# Patient Record
Sex: FEMALE | Race: WHITE | Employment: OTHER | URBAN - METROPOLITAN AREA
[De-identification: names, ages, dates, MRNs, and addresses within clinical notes are randomized per-mention and may not be internally consistent; named-entity substitution may affect disease eponyms.]

---

## 2017-01-16 ENCOUNTER — OFFICE VISIT (OUTPATIENT)
Dept: INTERNAL MEDICINE | Age: 82
End: 2017-01-16

## 2017-01-16 VITALS
BODY MASS INDEX: 22.6 KG/M2 | SYSTOLIC BLOOD PRESSURE: 122 MMHG | HEART RATE: 70 BPM | DIASTOLIC BLOOD PRESSURE: 70 MMHG | HEIGHT: 67 IN | WEIGHT: 144 LBS

## 2017-01-16 DIAGNOSIS — E03.9 HYPOTHYROIDISM, UNSPECIFIED TYPE: ICD-10-CM

## 2017-01-16 DIAGNOSIS — E78.5 HYPERLIPIDEMIA, UNSPECIFIED HYPERLIPIDEMIA TYPE: Primary | ICD-10-CM

## 2017-01-16 DIAGNOSIS — H83.09 LABYRINTHITIS, UNSPECIFIED LATERALITY: ICD-10-CM

## 2017-01-16 DIAGNOSIS — D36.9 TUBULAR ADENOMA: ICD-10-CM

## 2017-01-16 DIAGNOSIS — M15.9 GENERALIZED OSTEOARTHRITIS: ICD-10-CM

## 2017-01-16 DIAGNOSIS — E55.9 VITAMIN D DEFICIENCY DISEASE: ICD-10-CM

## 2017-01-16 DIAGNOSIS — H35.30 MACULAR DEGENERATION: ICD-10-CM

## 2017-01-16 PROCEDURE — 99213 OFFICE O/P EST LOW 20 MIN: CPT | Performed by: INTERNAL MEDICINE

## 2017-01-16 PROCEDURE — 4040F PNEUMOC VAC/ADMIN/RCVD: CPT | Performed by: INTERNAL MEDICINE

## 2017-01-16 PROCEDURE — 1090F PRES/ABSN URINE INCON ASSESS: CPT | Performed by: INTERNAL MEDICINE

## 2017-01-16 PROCEDURE — G8399 PT W/DXA RESULTS DOCUMENT: HCPCS | Performed by: INTERNAL MEDICINE

## 2017-01-16 PROCEDURE — G8419 CALC BMI OUT NRM PARAM NOF/U: HCPCS | Performed by: INTERNAL MEDICINE

## 2017-01-16 PROCEDURE — G8427 DOCREV CUR MEDS BY ELIG CLIN: HCPCS | Performed by: INTERNAL MEDICINE

## 2017-01-16 PROCEDURE — 1123F ACP DISCUSS/DSCN MKR DOCD: CPT | Performed by: INTERNAL MEDICINE

## 2017-01-16 PROCEDURE — G8484 FLU IMMUNIZE NO ADMIN: HCPCS | Performed by: INTERNAL MEDICINE

## 2017-01-16 PROCEDURE — 1036F TOBACCO NON-USER: CPT | Performed by: INTERNAL MEDICINE

## 2017-02-13 DIAGNOSIS — E78.5 HYPERLIPIDEMIA, UNSPECIFIED HYPERLIPIDEMIA TYPE: ICD-10-CM

## 2017-02-13 LAB
ALT SERPL-CCNC: 15 U/L (ref 10–40)
AST SERPL-CCNC: 18 U/L (ref 15–37)
CHOLESTEROL, TOTAL: 170 MG/DL (ref 0–199)
HDLC SERPL-MCNC: 48 MG/DL (ref 40–60)
LDL CHOLESTEROL CALCULATED: 107 MG/DL
TOTAL CK: 50 U/L (ref 26–192)
TRIGL SERPL-MCNC: 75 MG/DL (ref 0–150)
VLDLC SERPL CALC-MCNC: 15 MG/DL

## 2017-02-20 RX ORDER — MECLIZINE HYDROCHLORIDE 25 MG/1
TABLET ORAL
Qty: 60 TABLET | Refills: 3 | Status: SHIPPED | OUTPATIENT
Start: 2017-02-20

## 2017-02-20 RX ORDER — LEVOTHYROXINE SODIUM 0.03 MG/1
TABLET ORAL
Qty: 90 TABLET | Refills: 3 | Status: SHIPPED | OUTPATIENT
Start: 2017-02-20 | End: 2018-09-11 | Stop reason: SDUPTHER

## 2017-07-20 ENCOUNTER — OFFICE VISIT (OUTPATIENT)
Dept: INTERNAL MEDICINE | Age: 82
End: 2017-07-20

## 2017-07-20 VITALS
DIASTOLIC BLOOD PRESSURE: 76 MMHG | HEART RATE: 74 BPM | HEIGHT: 67 IN | SYSTOLIC BLOOD PRESSURE: 122 MMHG | BODY MASS INDEX: 21.97 KG/M2 | RESPIRATION RATE: 12 BRPM | WEIGHT: 140 LBS

## 2017-07-20 DIAGNOSIS — Z00.00 MEDICARE ANNUAL WELLNESS VISIT, SUBSEQUENT: Primary | ICD-10-CM

## 2017-07-20 DIAGNOSIS — Z12.11 SPECIAL SCREENING FOR MALIGNANT NEOPLASMS, COLON: ICD-10-CM

## 2017-07-20 DIAGNOSIS — E03.9 HYPOTHYROIDISM, UNSPECIFIED TYPE: ICD-10-CM

## 2017-07-20 DIAGNOSIS — E55.9 VITAMIN D DEFICIENCY DISEASE: ICD-10-CM

## 2017-07-20 DIAGNOSIS — Z00.00 PERIODIC HEALTH ASSESSMENT, GENERAL SCREENING, ADULT: ICD-10-CM

## 2017-07-20 DIAGNOSIS — E78.5 HYPERLIPIDEMIA, UNSPECIFIED HYPERLIPIDEMIA TYPE: ICD-10-CM

## 2017-07-20 LAB
BILIRUBIN, POC: NORMAL
BLOOD URINE, POC: NORMAL
CLARITY, POC: CLEAR
COLOR, POC: YELLOW
GLUCOSE URINE, POC: NORMAL
KETONES, POC: NORMAL
LEUKOCYTE EST, POC: NORMAL
NITRITE, POC: NORMAL
PH, POC: 6
PROTEIN, POC: NORMAL
SPECIFIC GRAVITY, POC: 1.01
UROBILINOGEN, POC: NORMAL

## 2017-07-20 PROCEDURE — G0439 PPPS, SUBSEQ VISIT: HCPCS | Performed by: INTERNAL MEDICINE

## 2017-07-20 PROCEDURE — 81002 URINALYSIS NONAUTO W/O SCOPE: CPT | Performed by: INTERNAL MEDICINE

## 2017-07-20 PROCEDURE — 93000 ELECTROCARDIOGRAM COMPLETE: CPT | Performed by: INTERNAL MEDICINE

## 2017-07-20 ASSESSMENT — LIFESTYLE VARIABLES: HOW OFTEN DO YOU HAVE A DRINK CONTAINING ALCOHOL: 0

## 2017-07-20 ASSESSMENT — ANXIETY QUESTIONNAIRES: GAD7 TOTAL SCORE: 0

## 2017-07-25 DIAGNOSIS — E03.9 HYPOTHYROIDISM, UNSPECIFIED TYPE: ICD-10-CM

## 2017-07-25 DIAGNOSIS — Z00.00 PERIODIC HEALTH ASSESSMENT, GENERAL SCREENING, ADULT: ICD-10-CM

## 2017-07-25 DIAGNOSIS — E78.5 HYPERLIPIDEMIA, UNSPECIFIED HYPERLIPIDEMIA TYPE: ICD-10-CM

## 2017-07-25 LAB
A/G RATIO: 2.1 (ref 1.1–2.2)
ALBUMIN SERPL-MCNC: 4.2 G/DL (ref 3.4–5)
ALP BLD-CCNC: 78 U/L (ref 40–129)
ALT SERPL-CCNC: 10 U/L (ref 10–40)
ANION GAP SERPL CALCULATED.3IONS-SCNC: 15 MMOL/L (ref 3–16)
AST SERPL-CCNC: 16 U/L (ref 15–37)
BASOPHILS ABSOLUTE: 0.1 K/UL (ref 0–0.2)
BASOPHILS RELATIVE PERCENT: 1 %
BILIRUB SERPL-MCNC: 0.4 MG/DL (ref 0–1)
BUN BLDV-MCNC: 23 MG/DL (ref 7–20)
CALCIUM SERPL-MCNC: 9.4 MG/DL (ref 8.3–10.6)
CHLORIDE BLD-SCNC: 101 MMOL/L (ref 99–110)
CHOLESTEROL, TOTAL: 204 MG/DL (ref 0–199)
CO2: 26 MMOL/L (ref 21–32)
CREAT SERPL-MCNC: 0.7 MG/DL (ref 0.6–1.2)
EOSINOPHILS ABSOLUTE: 0.1 K/UL (ref 0–0.6)
EOSINOPHILS RELATIVE PERCENT: 1.7 %
GFR AFRICAN AMERICAN: >60
GFR NON-AFRICAN AMERICAN: >60
GLOBULIN: 2 G/DL
GLUCOSE BLD-MCNC: 93 MG/DL (ref 70–99)
HCT VFR BLD CALC: 37.6 % (ref 36–48)
HDLC SERPL-MCNC: 53 MG/DL (ref 40–60)
HEMOGLOBIN: 12.6 G/DL (ref 12–16)
LDL CHOLESTEROL CALCULATED: 132 MG/DL
LYMPHOCYTES ABSOLUTE: 2.2 K/UL (ref 1–5.1)
LYMPHOCYTES RELATIVE PERCENT: 31.8 %
MCH RBC QN AUTO: 27.3 PG (ref 26–34)
MCHC RBC AUTO-ENTMCNC: 33.5 G/DL (ref 31–36)
MCV RBC AUTO: 81.4 FL (ref 80–100)
MONOCYTES ABSOLUTE: 0.4 K/UL (ref 0–1.3)
MONOCYTES RELATIVE PERCENT: 6.2 %
NEUTROPHILS ABSOLUTE: 4.1 K/UL (ref 1.7–7.7)
NEUTROPHILS RELATIVE PERCENT: 59.3 %
PDW BLD-RTO: 14.4 % (ref 12.4–15.4)
PLATELET # BLD: 169 K/UL (ref 135–450)
PMV BLD AUTO: 9.5 FL (ref 5–10.5)
POTASSIUM SERPL-SCNC: 4.8 MMOL/L (ref 3.5–5.1)
RBC # BLD: 4.63 M/UL (ref 4–5.2)
SODIUM BLD-SCNC: 142 MMOL/L (ref 136–145)
T4 FREE: 1.3 NG/DL (ref 0.9–1.8)
TOTAL CK: 63 U/L (ref 26–192)
TOTAL PROTEIN: 6.2 G/DL (ref 6.4–8.2)
TRIGL SERPL-MCNC: 95 MG/DL (ref 0–150)
TSH SERPL DL<=0.05 MIU/L-ACNC: 4.03 UIU/ML (ref 0.27–4.2)
VLDLC SERPL CALC-MCNC: 19 MG/DL
WBC # BLD: 7 K/UL (ref 4–11)

## 2017-07-31 RX ORDER — LEVOTHYROXINE SODIUM 0.03 MG/1
TABLET ORAL
Qty: 90 TABLET | Refills: 3 | Status: SHIPPED | OUTPATIENT
Start: 2017-07-31 | End: 2018-07-23 | Stop reason: SDUPTHER

## 2017-10-30 ENCOUNTER — HOSPITAL ENCOUNTER (OUTPATIENT)
Dept: MAMMOGRAPHY | Age: 82
Discharge: HOME | End: 2017-10-30
Attending: INTERNAL MEDICINE | Admitting: INTERNAL MEDICINE

## 2017-10-30 DIAGNOSIS — Z12.31 VISIT FOR SCREENING MAMMOGRAM: ICD-10-CM

## 2018-01-22 ENCOUNTER — OFFICE VISIT (OUTPATIENT)
Dept: INTERNAL MEDICINE | Age: 83
End: 2018-01-22

## 2018-01-22 ENCOUNTER — TELEPHONE (OUTPATIENT)
Dept: INTERNAL MEDICINE | Age: 83
End: 2018-01-22

## 2018-01-22 VITALS
DIASTOLIC BLOOD PRESSURE: 70 MMHG | RESPIRATION RATE: 12 BRPM | BODY MASS INDEX: 21.97 KG/M2 | SYSTOLIC BLOOD PRESSURE: 130 MMHG | WEIGHT: 140 LBS | HEART RATE: 68 BPM | HEIGHT: 67 IN

## 2018-01-22 DIAGNOSIS — E78.5 HYPERLIPIDEMIA, UNSPECIFIED HYPERLIPIDEMIA TYPE: Primary | ICD-10-CM

## 2018-01-22 DIAGNOSIS — E03.9 HYPOTHYROIDISM, UNSPECIFIED TYPE: ICD-10-CM

## 2018-01-22 DIAGNOSIS — E55.9 VITAMIN D DEFICIENCY DISEASE: ICD-10-CM

## 2018-01-22 PROCEDURE — G8399 PT W/DXA RESULTS DOCUMENT: HCPCS | Performed by: INTERNAL MEDICINE

## 2018-01-22 PROCEDURE — G8484 FLU IMMUNIZE NO ADMIN: HCPCS | Performed by: INTERNAL MEDICINE

## 2018-01-22 PROCEDURE — 99213 OFFICE O/P EST LOW 20 MIN: CPT | Performed by: INTERNAL MEDICINE

## 2018-01-22 PROCEDURE — G8420 CALC BMI NORM PARAMETERS: HCPCS | Performed by: INTERNAL MEDICINE

## 2018-01-22 PROCEDURE — 1090F PRES/ABSN URINE INCON ASSESS: CPT | Performed by: INTERNAL MEDICINE

## 2018-01-22 PROCEDURE — 1123F ACP DISCUSS/DSCN MKR DOCD: CPT | Performed by: INTERNAL MEDICINE

## 2018-01-22 PROCEDURE — 1036F TOBACCO NON-USER: CPT | Performed by: INTERNAL MEDICINE

## 2018-01-22 PROCEDURE — G8427 DOCREV CUR MEDS BY ELIG CLIN: HCPCS | Performed by: INTERNAL MEDICINE

## 2018-01-22 PROCEDURE — 4040F PNEUMOC VAC/ADMIN/RCVD: CPT | Performed by: INTERNAL MEDICINE

## 2018-01-22 RX ORDER — LANSOPRAZOLE 30 MG/1
30 CAPSULE, DELAYED RELEASE ORAL DAILY
COMMUNITY
End: 2019-06-11

## 2018-01-22 NOTE — PROGRESS NOTES
not applicable      Assessment: Hyperlipidemia. GERD - controlled. Plan:               Same regimen.  To come in for a more thorough exam.      Imer Torres MD

## 2018-03-13 RX ORDER — SIMVASTATIN 20 MG
TABLET ORAL
Qty: 90 TABLET | Refills: 3 | Status: SHIPPED | OUTPATIENT
Start: 2018-03-13 | End: 2018-11-24 | Stop reason: SDUPTHER

## 2018-05-10 ENCOUNTER — TELEPHONE (OUTPATIENT)
Dept: INTERNAL MEDICINE | Age: 83
End: 2018-05-10

## 2018-05-29 ENCOUNTER — OFFICE VISIT (OUTPATIENT)
Dept: INTERNAL MEDICINE | Age: 83
End: 2018-05-29

## 2018-05-29 ENCOUNTER — TELEPHONE (OUTPATIENT)
Dept: INTERNAL MEDICINE | Age: 83
End: 2018-05-29

## 2018-05-29 VITALS
BODY MASS INDEX: 22.13 KG/M2 | HEIGHT: 67 IN | DIASTOLIC BLOOD PRESSURE: 80 MMHG | RESPIRATION RATE: 12 BRPM | HEART RATE: 68 BPM | SYSTOLIC BLOOD PRESSURE: 122 MMHG | WEIGHT: 141 LBS

## 2018-05-29 DIAGNOSIS — R53.83 OTHER FATIGUE: Primary | ICD-10-CM

## 2018-05-29 PROCEDURE — 99213 OFFICE O/P EST LOW 20 MIN: CPT | Performed by: INTERNAL MEDICINE

## 2018-05-29 PROCEDURE — G8399 PT W/DXA RESULTS DOCUMENT: HCPCS | Performed by: INTERNAL MEDICINE

## 2018-05-29 PROCEDURE — 1036F TOBACCO NON-USER: CPT | Performed by: INTERNAL MEDICINE

## 2018-05-29 PROCEDURE — G8420 CALC BMI NORM PARAMETERS: HCPCS | Performed by: INTERNAL MEDICINE

## 2018-05-29 PROCEDURE — 1123F ACP DISCUSS/DSCN MKR DOCD: CPT | Performed by: INTERNAL MEDICINE

## 2018-05-29 PROCEDURE — 4040F PNEUMOC VAC/ADMIN/RCVD: CPT | Performed by: INTERNAL MEDICINE

## 2018-05-29 PROCEDURE — G8427 DOCREV CUR MEDS BY ELIG CLIN: HCPCS | Performed by: INTERNAL MEDICINE

## 2018-05-29 PROCEDURE — 1090F PRES/ABSN URINE INCON ASSESS: CPT | Performed by: INTERNAL MEDICINE

## 2018-07-23 ENCOUNTER — OFFICE VISIT (OUTPATIENT)
Dept: INTERNAL MEDICINE | Age: 83
End: 2018-07-23

## 2018-07-23 VITALS
SYSTOLIC BLOOD PRESSURE: 126 MMHG | WEIGHT: 143 LBS | HEIGHT: 67 IN | DIASTOLIC BLOOD PRESSURE: 76 MMHG | HEART RATE: 72 BPM | BODY MASS INDEX: 22.44 KG/M2 | RESPIRATION RATE: 12 BRPM

## 2018-07-23 DIAGNOSIS — E78.5 HYPERLIPIDEMIA, UNSPECIFIED HYPERLIPIDEMIA TYPE: ICD-10-CM

## 2018-07-23 DIAGNOSIS — E03.9 HYPOTHYROIDISM, UNSPECIFIED TYPE: ICD-10-CM

## 2018-07-23 DIAGNOSIS — R42 DIZZY: ICD-10-CM

## 2018-07-23 DIAGNOSIS — Z12.11 SPECIAL SCREENING FOR MALIGNANT NEOPLASMS, COLON: ICD-10-CM

## 2018-07-23 DIAGNOSIS — Z00.00 MEDICARE ANNUAL WELLNESS VISIT, SUBSEQUENT: Primary | ICD-10-CM

## 2018-07-23 LAB
BILIRUBIN, POC: NORMAL
BLOOD URINE, POC: NORMAL
CLARITY, POC: CLEAR
COLOR, POC: YELLOW
GLUCOSE URINE, POC: NORMAL
KETONES, POC: NORMAL
LEUKOCYTE EST, POC: NORMAL
NITRITE, POC: NORMAL
PH, POC: 5
PROTEIN, POC: NORMAL
SPECIFIC GRAVITY, POC: 1.02
UROBILINOGEN, POC: NORMAL

## 2018-07-23 PROCEDURE — 81002 URINALYSIS NONAUTO W/O SCOPE: CPT | Performed by: INTERNAL MEDICINE

## 2018-07-23 PROCEDURE — 4040F PNEUMOC VAC/ADMIN/RCVD: CPT | Performed by: INTERNAL MEDICINE

## 2018-07-23 PROCEDURE — G0439 PPPS, SUBSEQ VISIT: HCPCS | Performed by: INTERNAL MEDICINE

## 2018-07-23 PROCEDURE — 93000 ELECTROCARDIOGRAM COMPLETE: CPT | Performed by: INTERNAL MEDICINE

## 2018-07-23 ASSESSMENT — PATIENT HEALTH QUESTIONNAIRE - PHQ9: SUM OF ALL RESPONSES TO PHQ QUESTIONS 1-9: 0

## 2018-07-23 ASSESSMENT — ANXIETY QUESTIONNAIRES: GAD7 TOTAL SCORE: 0

## 2018-07-23 ASSESSMENT — LIFESTYLE VARIABLES: HOW OFTEN DO YOU HAVE A DRINK CONTAINING ALCOHOL: 0

## 2018-07-23 NOTE — PROGRESS NOTES
COLONOSCOPY  *Apr., 2017 ( prn )    Dr. Andrés Maldonado - diverticulosis, tubular adenoma    COLONOSCOPY  *April 11, 2017 ( prn )    Dr. Andrés Maldonado - tubular adenomata ( 3 )    EYE SURGERY  June, 2007    bilateral cataract extraction    UPPER GASTROINTESTINAL ENDOSCOPY  July, 2009    Dr. Fletcher Sandhoff - small sliding hiatus hernia    UPPER GASTROINTESTINAL ENDOSCOPY  Feb., 2012    Dr. Jimmy Santos - small stomach polyp   Rosemead Hug, 2015    Dr. Pola Lemus -        Current Outpatient Prescriptions   Medication Sig Dispense Refill    Multiple Vitamins-Minerals (PRESERVISION AREDS 2+MULTI VIT PO) Take by mouth daily      simvastatin (ZOCOR) 20 MG tablet TAKE 1 TABLET EVERY EVENING 90 tablet 3    levothyroxine (SYNTHROID) 25 MCG tablet TAKE 1 TABLET EVERY DAY 90 tablet 3    meclizine (ANTIVERT) 25 MG tablet TAKE 1 TABLET THREE TIMES A DAY AS NEEDED FOR VERTIGO 60 tablet 3    esomeprazole Magnesium (NEXIUM) 20 MG PACK Take 20 mg by mouth daily      B Complex Vitamins (VITAMIN-B COMPLEX) TABS Take  by mouth daily.  vitamin D (CHOLECALCIFEROL) 400 UNITS TABS tablet Take 2,000 Units by mouth daily       lansoprazole (PREVACID) 30 MG delayed release capsule Take 30 mg by mouth daily       No current facility-administered medications for this visit. Allergies   Allergen Reactions    Codeine     Epinephrine     Feldene [Piroxicam]     Other      Antidepressants    Reglan [Metoclopramide Hcl]        Review of Systems:     Immunization History   Administered Date(s) Administered    Influenza, High Dose 11/20/2013, 11/01/2016    Pneumococcal 13-valent Conjugate ([id number]) 11/01/2016    Pneumococcal Polysaccharide ([id number]) 06/12/2008    Td 12/03/2004    Tdap (Boostrix, Adacel) 01/19/2015    Zoster Live (Zostavax) 06/12/2008     Eye Exam:  July 19, 2018 by Dr. Tj Ricketts.   She will be seeing Dr. Hugh Soler  Chest: Denies: cough, hemoptysis, shortness of breath, pleuritic chest pain, wheezing  Cardiovascular: Denies: chest pain, dyspnea on exertion, orthopnea, paroxysmal nocturnal dyspnea, edema, palpitations  Abdomen: no abdominal pain, change in bowel habits, or black or bloody stools  Colonoscopy:  April, 2017 by Dr. Dollie Jeans revealed three tubular adenomas. To be repeated prn   Fall Risk low  ADL without assistance   Mammography:  October 30, 2017   DEXA: January, 2013 revealed a lumbar T score of - 1.2 and a hip T score of - 1.1. Pelvic and PAP: N/A  Other: For the last several weeks she has had dizziness with no other symptoms      Physical Exam:  General appearance: alert, appears stated age and cooperative  /76 (Site: Left Arm, Position: Sitting, Cuff Size: Medium Adult)   Pulse 72   Resp 12   Ht 5' 7\" (1.702 m)   Wt 143 lb (64.9 kg)   BMI 22.40 kg/m²   Eyes: conjunctivae/corneas clear. PERRL, EOM's intact. Fundi benign. Ears: normal TM's and external ear canals both ears  Nose: Nares normal. Septum midline. Mucosa normal. No drainage or sinus tenderness. Throat: no abnormalities  Neck: no adenopathy, no carotid bruit, no JVD, supple, symmetrical, trachea midline and thyroid not enlarged, symmetric, no tenderness/mass/nodules  Nodes:no adenopathy palpable  Breasts:Breasts symmetrical, skin without lesion(s), no nipple retraction or dimpling, no nipple discharge, no masses palpated, no axillary or supraclavicular adenopathy  Lungs: clear to auscultation bilaterally  Heart: regular rate and rhythm, S1, S2 normal, no murmur, click, rub or gallop  Abdomen: soft, non-tender; bowel sounds normal; no masses,  no organomegaly  Extremities: extremities normal, atraumatic, no cyanosis or edema  Neurological: Gait normal. Reflexes normal and symmetric.  Sensation grossly normal  Pulse: radial=4/4, femoral=4/4, popliteal=4/4, dorsalis pedis=4/4,   Skin: normal coloration and turgor, no rashes, no suspicious skin lesions noted  Psych: normal    Lab Review: not applicable  Assessment:

## 2018-07-25 ENCOUNTER — HOSPITAL ENCOUNTER (OUTPATIENT)
Dept: VASCULAR LAB | Age: 83
Discharge: HOME | End: 2018-07-25
Payer: MEDICARE

## 2018-07-25 DIAGNOSIS — E03.9 HYPOTHYROIDISM, UNSPECIFIED TYPE: ICD-10-CM

## 2018-07-25 DIAGNOSIS — R42 DIZZY: ICD-10-CM

## 2018-07-25 DIAGNOSIS — E78.5 HYPERLIPIDEMIA, UNSPECIFIED HYPERLIPIDEMIA TYPE: ICD-10-CM

## 2018-07-25 LAB
A/G RATIO: 2 (ref 1.1–2.2)
ALBUMIN SERPL-MCNC: 4.5 G/DL (ref 3.4–5)
ALP BLD-CCNC: 63 U/L (ref 40–129)
ALT SERPL-CCNC: 15 U/L (ref 10–40)
ANION GAP SERPL CALCULATED.3IONS-SCNC: 13 MMOL/L (ref 3–16)
AST SERPL-CCNC: 20 U/L (ref 15–37)
BASOPHILS ABSOLUTE: 0.1 K/UL (ref 0–0.2)
BASOPHILS RELATIVE PERCENT: 0.7 %
BILIRUB SERPL-MCNC: 0.4 MG/DL (ref 0–1)
BUN BLDV-MCNC: 24 MG/DL (ref 7–20)
CALCIUM SERPL-MCNC: 9.8 MG/DL (ref 8.3–10.6)
CHLORIDE BLD-SCNC: 103 MMOL/L (ref 99–110)
CHOLESTEROL, TOTAL: 194 MG/DL (ref 0–199)
CO2: 27 MMOL/L (ref 21–32)
CREAT SERPL-MCNC: 0.7 MG/DL (ref 0.6–1.2)
EOSINOPHILS ABSOLUTE: 0.1 K/UL (ref 0–0.6)
EOSINOPHILS RELATIVE PERCENT: 1.6 %
GFR AFRICAN AMERICAN: >60
GFR NON-AFRICAN AMERICAN: >60
GLOBULIN: 2.2 G/DL
GLUCOSE BLD-MCNC: 97 MG/DL (ref 70–99)
HCT VFR BLD CALC: 39.2 % (ref 36–48)
HDLC SERPL-MCNC: 61 MG/DL (ref 40–60)
HEMOGLOBIN: 13.2 G/DL (ref 12–16)
LDL CHOLESTEROL CALCULATED: 116 MG/DL
LYMPHOCYTES ABSOLUTE: 2.2 K/UL (ref 1–5.1)
LYMPHOCYTES RELATIVE PERCENT: 29.7 %
MCH RBC QN AUTO: 27.7 PG (ref 26–34)
MCHC RBC AUTO-ENTMCNC: 33.7 G/DL (ref 31–36)
MCV RBC AUTO: 82.2 FL (ref 80–100)
MONOCYTES ABSOLUTE: 0.5 K/UL (ref 0–1.3)
MONOCYTES RELATIVE PERCENT: 6.5 %
NEUTROPHILS ABSOLUTE: 4.6 K/UL (ref 1.7–7.7)
NEUTROPHILS RELATIVE PERCENT: 61.5 %
PDW BLD-RTO: 14.5 % (ref 12.4–15.4)
PLATELET # BLD: 185 K/UL (ref 135–450)
PMV BLD AUTO: 9.5 FL (ref 5–10.5)
POTASSIUM SERPL-SCNC: 5.1 MMOL/L (ref 3.5–5.1)
RBC # BLD: 4.76 M/UL (ref 4–5.2)
SODIUM BLD-SCNC: 143 MMOL/L (ref 136–145)
TOTAL CK: 65 U/L (ref 26–192)
TOTAL PROTEIN: 6.7 G/DL (ref 6.4–8.2)
TRIGL SERPL-MCNC: 86 MG/DL (ref 0–150)
TSH SERPL DL<=0.05 MIU/L-ACNC: 2.71 UIU/ML (ref 0.27–4.2)
VLDLC SERPL CALC-MCNC: 17 MG/DL
WBC # BLD: 7.5 K/UL (ref 4–11)

## 2018-07-25 PROCEDURE — 93880 EXTRACRANIAL BILAT STUDY: CPT

## 2018-08-09 DIAGNOSIS — Z12.11 SPECIAL SCREENING FOR MALIGNANT NEOPLASMS, COLON: ICD-10-CM

## 2018-08-09 LAB
CONTROL: NORMAL
HEMOCCULT STL QL: NORMAL

## 2018-08-09 PROCEDURE — 82274 ASSAY TEST FOR BLOOD FECAL: CPT | Performed by: INTERNAL MEDICINE

## 2018-09-11 RX ORDER — LEVOTHYROXINE SODIUM 0.03 MG/1
TABLET ORAL
Qty: 90 TABLET | Refills: 3 | Status: SHIPPED | OUTPATIENT
Start: 2018-09-11 | End: 2019-09-23 | Stop reason: SDUPTHER

## 2018-11-16 ENCOUNTER — HOSPITAL ENCOUNTER (OUTPATIENT)
Dept: MAMMOGRAPHY | Age: 83
Discharge: HOME | End: 2018-11-16
Payer: MEDICARE

## 2018-11-16 DIAGNOSIS — Z12.31 VISIT FOR SCREENING MAMMOGRAM: ICD-10-CM

## 2018-11-16 PROCEDURE — 77067 SCR MAMMO BI INCL CAD: CPT

## 2018-11-24 RX ORDER — SIMVASTATIN 20 MG
TABLET ORAL
Qty: 90 TABLET | Refills: 0 | Status: SHIPPED | OUTPATIENT
Start: 2018-11-24 | End: 2019-05-13 | Stop reason: SDUPTHER

## 2019-01-21 ENCOUNTER — OFFICE VISIT (OUTPATIENT)
Dept: INTERNAL MEDICINE CLINIC | Age: 84
End: 2019-01-21
Payer: MEDICARE

## 2019-01-21 VITALS
HEART RATE: 68 BPM | HEIGHT: 67 IN | WEIGHT: 148 LBS | RESPIRATION RATE: 12 BRPM | BODY MASS INDEX: 23.23 KG/M2 | SYSTOLIC BLOOD PRESSURE: 118 MMHG | DIASTOLIC BLOOD PRESSURE: 78 MMHG

## 2019-01-21 DIAGNOSIS — E78.5 HYPERLIPIDEMIA, UNSPECIFIED HYPERLIPIDEMIA TYPE: Primary | ICD-10-CM

## 2019-01-21 DIAGNOSIS — E03.9 HYPOTHYROIDISM, UNSPECIFIED TYPE: ICD-10-CM

## 2019-01-21 PROCEDURE — G8482 FLU IMMUNIZE ORDER/ADMIN: HCPCS | Performed by: INTERNAL MEDICINE

## 2019-01-21 PROCEDURE — 1101F PT FALLS ASSESS-DOCD LE1/YR: CPT | Performed by: INTERNAL MEDICINE

## 2019-01-21 PROCEDURE — 1090F PRES/ABSN URINE INCON ASSESS: CPT | Performed by: INTERNAL MEDICINE

## 2019-01-21 PROCEDURE — 1036F TOBACCO NON-USER: CPT | Performed by: INTERNAL MEDICINE

## 2019-01-21 PROCEDURE — 99213 OFFICE O/P EST LOW 20 MIN: CPT | Performed by: INTERNAL MEDICINE

## 2019-01-21 PROCEDURE — 1123F ACP DISCUSS/DSCN MKR DOCD: CPT | Performed by: INTERNAL MEDICINE

## 2019-01-21 PROCEDURE — 4040F PNEUMOC VAC/ADMIN/RCVD: CPT | Performed by: INTERNAL MEDICINE

## 2019-01-21 PROCEDURE — G8399 PT W/DXA RESULTS DOCUMENT: HCPCS | Performed by: INTERNAL MEDICINE

## 2019-01-21 PROCEDURE — G8427 DOCREV CUR MEDS BY ELIG CLIN: HCPCS | Performed by: INTERNAL MEDICINE

## 2019-01-21 PROCEDURE — G8420 CALC BMI NORM PARAMETERS: HCPCS | Performed by: INTERNAL MEDICINE

## 2019-05-13 RX ORDER — SIMVASTATIN 20 MG
TABLET ORAL
Qty: 90 TABLET | Refills: 3 | Status: SHIPPED | OUTPATIENT
Start: 2019-05-13 | End: 2020-06-05

## 2019-05-28 ENCOUNTER — TELEPHONE (OUTPATIENT)
Dept: INTERNAL MEDICINE CLINIC | Age: 84
End: 2019-05-28

## 2019-05-28 NOTE — TELEPHONE ENCOUNTER
She said it is the same thing as when she had low calcium   She said she wants to see you   Do you want her to do a blood test first?

## 2019-06-11 ENCOUNTER — OFFICE VISIT (OUTPATIENT)
Dept: INTERNAL MEDICINE CLINIC | Age: 84
End: 2019-06-11
Payer: MEDICARE

## 2019-06-11 ENCOUNTER — HOSPITAL ENCOUNTER (OUTPATIENT)
Dept: GENERAL RADIOLOGY | Age: 84
Discharge: HOME | End: 2019-06-11
Payer: MEDICARE

## 2019-06-11 ENCOUNTER — HOSPITAL ENCOUNTER (OUTPATIENT)
Age: 84
Discharge: HOME | End: 2019-06-11
Payer: MEDICARE

## 2019-06-11 VITALS
HEIGHT: 67 IN | DIASTOLIC BLOOD PRESSURE: 76 MMHG | HEART RATE: 68 BPM | WEIGHT: 148 LBS | RESPIRATION RATE: 12 BRPM | BODY MASS INDEX: 23.23 KG/M2 | SYSTOLIC BLOOD PRESSURE: 124 MMHG

## 2019-06-11 DIAGNOSIS — M25.551 PAIN OF BOTH HIP JOINTS: ICD-10-CM

## 2019-06-11 DIAGNOSIS — M25.552 PAIN OF BOTH HIP JOINTS: ICD-10-CM

## 2019-06-11 DIAGNOSIS — M25.552 PAIN OF BOTH HIP JOINTS: Primary | ICD-10-CM

## 2019-06-11 DIAGNOSIS — M25.551 PAIN OF BOTH HIP JOINTS: Primary | ICD-10-CM

## 2019-06-11 PROCEDURE — 72100 X-RAY EXAM L-S SPINE 2/3 VWS: CPT

## 2019-06-11 PROCEDURE — G8399 PT W/DXA RESULTS DOCUMENT: HCPCS | Performed by: INTERNAL MEDICINE

## 2019-06-11 PROCEDURE — 1123F ACP DISCUSS/DSCN MKR DOCD: CPT | Performed by: INTERNAL MEDICINE

## 2019-06-11 PROCEDURE — 73521 X-RAY EXAM HIPS BI 2 VIEWS: CPT

## 2019-06-11 PROCEDURE — 99213 OFFICE O/P EST LOW 20 MIN: CPT | Performed by: INTERNAL MEDICINE

## 2019-06-11 PROCEDURE — G8420 CALC BMI NORM PARAMETERS: HCPCS | Performed by: INTERNAL MEDICINE

## 2019-06-11 PROCEDURE — 4040F PNEUMOC VAC/ADMIN/RCVD: CPT | Performed by: INTERNAL MEDICINE

## 2019-06-11 PROCEDURE — 1090F PRES/ABSN URINE INCON ASSESS: CPT | Performed by: INTERNAL MEDICINE

## 2019-06-11 PROCEDURE — 1036F TOBACCO NON-USER: CPT | Performed by: INTERNAL MEDICINE

## 2019-06-11 PROCEDURE — G8427 DOCREV CUR MEDS BY ELIG CLIN: HCPCS | Performed by: INTERNAL MEDICINE

## 2019-06-11 RX ORDER — FAMOTIDINE 20 MG/1
20 TABLET, FILM COATED ORAL 2 TIMES DAILY
COMMUNITY
End: 2019-07-25 | Stop reason: CLARIF

## 2019-06-11 ASSESSMENT — PATIENT HEALTH QUESTIONNAIRE - PHQ9
SUM OF ALL RESPONSES TO PHQ9 QUESTIONS 1 & 2: 0
1. LITTLE INTEREST OR PLEASURE IN DOING THINGS: 0
SUM OF ALL RESPONSES TO PHQ QUESTIONS 1-9: 0
2. FEELING DOWN, DEPRESSED OR HOPELESS: 0
SUM OF ALL RESPONSES TO PHQ QUESTIONS 1-9: 0

## 2019-06-11 NOTE — PROGRESS NOTES
 Hypothyroidism     Past Medical History:   Diagnosis Date    Diverticulosis     Encounter for screening mammogram for breast cancer Octobere 9, 2015    Benign    GERD (gastroesophageal reflux disease)     Hyperlipidemia     Hypothyroidism 06/20/2016    Subclinical    Labyrinthitis     Macular degeneration     Osteopenia DEXA - June, 2010    Lumbar T score -1.2 and Hip T score -1.2    Osteopenia DEXA - Jan., 2013    Lumbar T score -1.2 and Hip T score -1.1    Other screening mammogram January 10, 2013    Benign    Other screening mammogram July 10, 2014    Negative    Rotator cuff tear, right Nov., 2015    Right by MRI    Screening mammogram for high-risk patient January 7, 2011    Negative    Screening mammogram, encounter for *October 9, 2015    Benign    Screening mammogram, encounter for *October 25, 2016    Benign    Screening mammogram, encounter for *Oct. 30, 2017    Negative    Screening mammogram, encounter for *November 16, 2018    Benign    Spinal stenosis     Tubular adenoma     Vitamin D deficiency disease 2013    Level - 24     Past Surgical History:   Procedure Laterality Date    APPENDECTOMY      COLONOSCOPY  July, 2009 ( 2014 )    Dr. Sherren Manning - left sided diverticulosis     COLONOSCOPY  *Apr., 2017 ( prn )    Dr. Allison Moon - diverticulosis, tubular adenoma    COLONOSCOPY  *April 11, 2017 ( prn )    Dr. Allison Moon - tubular adenomata ( 3 )    EYE SURGERY  June, 2007    bilateral cataract extraction    UPPER GASTROINTESTINAL ENDOSCOPY  July, 2009    Dr. Sherren Manning - small sliding hiatus hernia    UPPER GASTROINTESTINAL ENDOSCOPY  Feb., 2012    Dr. Ramesh Castro - small stomach polyp    Carole Lelia, 2015    Dr. Jere Mathew -      Current Outpatient Medications   Medication Sig Dispense Refill    famotidine (PEPCID) 20 MG tablet Take 20 mg by mouth 2 times daily      simvastatin (ZOCOR) 20 MG tablet TAKE 1 TABLET EVERY EVENING*ACCORD MFR* 90 tablet 3   

## 2019-07-25 ENCOUNTER — OFFICE VISIT (OUTPATIENT)
Dept: INTERNAL MEDICINE CLINIC | Age: 84
End: 2019-07-25
Payer: MEDICARE

## 2019-07-25 VITALS
RESPIRATION RATE: 12 BRPM | BODY MASS INDEX: 22.29 KG/M2 | HEART RATE: 78 BPM | WEIGHT: 142 LBS | SYSTOLIC BLOOD PRESSURE: 132 MMHG | HEIGHT: 67 IN | DIASTOLIC BLOOD PRESSURE: 76 MMHG

## 2019-07-25 DIAGNOSIS — E78.5 HYPERLIPIDEMIA, UNSPECIFIED HYPERLIPIDEMIA TYPE: ICD-10-CM

## 2019-07-25 DIAGNOSIS — Z00.00 MEDICARE ANNUAL WELLNESS VISIT, SUBSEQUENT: Primary | ICD-10-CM

## 2019-07-25 DIAGNOSIS — Z12.11 SPECIAL SCREENING FOR MALIGNANT NEOPLASMS, COLON: ICD-10-CM

## 2019-07-25 DIAGNOSIS — E03.9 HYPOTHYROIDISM, UNSPECIFIED TYPE: ICD-10-CM

## 2019-07-25 PROCEDURE — 1123F ACP DISCUSS/DSCN MKR DOCD: CPT | Performed by: INTERNAL MEDICINE

## 2019-07-25 PROCEDURE — 81002 URINALYSIS NONAUTO W/O SCOPE: CPT | Performed by: INTERNAL MEDICINE

## 2019-07-25 PROCEDURE — G0439 PPPS, SUBSEQ VISIT: HCPCS | Performed by: INTERNAL MEDICINE

## 2019-07-25 PROCEDURE — 4040F PNEUMOC VAC/ADMIN/RCVD: CPT | Performed by: INTERNAL MEDICINE

## 2019-07-25 PROCEDURE — 93000 ELECTROCARDIOGRAM COMPLETE: CPT | Performed by: INTERNAL MEDICINE

## 2019-07-25 ASSESSMENT — PATIENT HEALTH QUESTIONNAIRE - PHQ9
SUM OF ALL RESPONSES TO PHQ QUESTIONS 1-9: 0
SUM OF ALL RESPONSES TO PHQ QUESTIONS 1-9: 0

## 2019-07-25 ASSESSMENT — LIFESTYLE VARIABLES: HOW OFTEN DO YOU HAVE A DRINK CONTAINING ALCOHOL: 0

## 2019-07-29 DIAGNOSIS — E03.9 HYPOTHYROIDISM, UNSPECIFIED TYPE: ICD-10-CM

## 2019-07-29 DIAGNOSIS — E78.5 HYPERLIPIDEMIA, UNSPECIFIED HYPERLIPIDEMIA TYPE: ICD-10-CM

## 2019-07-29 LAB
A/G RATIO: 1.9 (ref 1.1–2.2)
ALBUMIN SERPL-MCNC: 4.1 G/DL (ref 3.4–5)
ALP BLD-CCNC: 58 U/L (ref 40–129)
ALT SERPL-CCNC: 10 U/L (ref 10–40)
ANION GAP SERPL CALCULATED.3IONS-SCNC: 14 MMOL/L (ref 3–16)
AST SERPL-CCNC: 15 U/L (ref 15–37)
BASOPHILS ABSOLUTE: 0.1 K/UL (ref 0–0.2)
BASOPHILS RELATIVE PERCENT: 0.6 %
BILIRUB SERPL-MCNC: 0.3 MG/DL (ref 0–1)
BUN BLDV-MCNC: 17 MG/DL (ref 7–20)
CALCIUM SERPL-MCNC: 9.3 MG/DL (ref 8.3–10.6)
CHLORIDE BLD-SCNC: 102 MMOL/L (ref 99–110)
CHOLESTEROL, TOTAL: 135 MG/DL (ref 0–199)
CO2: 24 MMOL/L (ref 21–32)
CREAT SERPL-MCNC: 0.7 MG/DL (ref 0.6–1.2)
EOSINOPHILS ABSOLUTE: 0.1 K/UL (ref 0–0.6)
EOSINOPHILS RELATIVE PERCENT: 1 %
GFR AFRICAN AMERICAN: >60
GFR NON-AFRICAN AMERICAN: >60
GLOBULIN: 2.2 G/DL
GLUCOSE BLD-MCNC: 102 MG/DL (ref 70–99)
HCT VFR BLD CALC: 34.6 % (ref 36–48)
HDLC SERPL-MCNC: 47 MG/DL (ref 40–60)
HEMOGLOBIN: 11.6 G/DL (ref 12–16)
LDL CHOLESTEROL CALCULATED: 75 MG/DL
LYMPHOCYTES ABSOLUTE: 1.9 K/UL (ref 1–5.1)
LYMPHOCYTES RELATIVE PERCENT: 19.8 %
MCH RBC QN AUTO: 27 PG (ref 26–34)
MCHC RBC AUTO-ENTMCNC: 33.5 G/DL (ref 31–36)
MCV RBC AUTO: 80.7 FL (ref 80–100)
MONOCYTES ABSOLUTE: 0.6 K/UL (ref 0–1.3)
MONOCYTES RELATIVE PERCENT: 6.6 %
NEUTROPHILS ABSOLUTE: 6.9 K/UL (ref 1.7–7.7)
NEUTROPHILS RELATIVE PERCENT: 72 %
PDW BLD-RTO: 13.7 % (ref 12.4–15.4)
PLATELET # BLD: 206 K/UL (ref 135–450)
PMV BLD AUTO: 9.5 FL (ref 5–10.5)
POTASSIUM SERPL-SCNC: 4.4 MMOL/L (ref 3.5–5.1)
RBC # BLD: 4.29 M/UL (ref 4–5.2)
SODIUM BLD-SCNC: 140 MMOL/L (ref 136–145)
TOTAL CK: 47 U/L (ref 26–192)
TOTAL PROTEIN: 6.3 G/DL (ref 6.4–8.2)
TRIGL SERPL-MCNC: 66 MG/DL (ref 0–150)
TSH SERPL DL<=0.05 MIU/L-ACNC: 2.14 UIU/ML (ref 0.27–4.2)
VLDLC SERPL CALC-MCNC: 13 MG/DL
WBC # BLD: 9.5 K/UL (ref 4–11)

## 2019-07-29 RX ORDER — FAMOTIDINE 20 MG/1
20 TABLET, FILM COATED ORAL DAILY
COMMUNITY
End: 2020-07-27

## 2019-07-30 DIAGNOSIS — D64.9 ANEMIA, UNSPECIFIED TYPE: Primary | ICD-10-CM

## 2019-07-30 DIAGNOSIS — D64.9 ANEMIA, UNSPECIFIED TYPE: ICD-10-CM

## 2019-07-30 LAB
IRON SATURATION: 17 % (ref 15–50)
IRON: 44 UG/DL (ref 37–145)
TOTAL IRON BINDING CAPACITY: 253 UG/DL (ref 260–445)

## 2019-08-07 ENCOUNTER — TELEPHONE (OUTPATIENT)
Dept: INTERNAL MEDICINE CLINIC | Age: 84
End: 2019-08-07

## 2019-08-07 DIAGNOSIS — M15.9 OSTEOARTHRITIS OF MULTIPLE JOINTS, UNSPECIFIED OSTEOARTHRITIS TYPE: Primary | ICD-10-CM

## 2019-08-07 NOTE — TELEPHONE ENCOUNTER
Pain in hand from arthritis is very bad. Can't even do therapy.   Would like a referral to Dr. Aide Horne  Fax # [phone]

## 2019-09-23 RX ORDER — LEVOTHYROXINE SODIUM 0.03 MG/1
TABLET ORAL
Qty: 90 TABLET | Refills: 3 | Status: SHIPPED | OUTPATIENT
Start: 2019-09-23 | End: 2020-07-24

## 2019-09-24 ENCOUNTER — HOSPITAL ENCOUNTER (EMERGENCY)
Age: 84
Discharge: HOME | End: 2019-09-24
Attending: EMERGENCY MEDICINE
Payer: MEDICARE

## 2019-09-24 ENCOUNTER — APPOINTMENT (OUTPATIENT)
Dept: CT IMAGING | Age: 84
End: 2019-09-24
Payer: MEDICARE

## 2019-09-24 VITALS
OXYGEN SATURATION: 97 % | SYSTOLIC BLOOD PRESSURE: 166 MMHG | HEART RATE: 98 BPM | WEIGHT: 142 LBS | RESPIRATION RATE: 18 BRPM | HEIGHT: 67 IN | BODY MASS INDEX: 22.29 KG/M2 | TEMPERATURE: 98.3 F | DIASTOLIC BLOOD PRESSURE: 81 MMHG

## 2019-09-24 DIAGNOSIS — S00.93XA TRAUMATIC CEPHALOHEMATOMA, INITIAL ENCOUNTER: Primary | ICD-10-CM
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
## 2019-09-24 PROCEDURE — 99284 EMERGENCY DEPT VISIT MOD MDM: CPT

## 2019-09-24 PROCEDURE — 6370000000 HC RX 637 (ALT 250 FOR IP): Performed by: EMERGENCY MEDICINE

## 2019-09-24 PROCEDURE — 70450 CT HEAD/BRAIN W/O DYE: CPT

## 2019-09-24 RX ORDER — ACETAMINOPHEN 325 MG/1
650 TABLET ORAL ONCE
Status: COMPLETED | OUTPATIENT
Start: 2019-09-24 | End: 2019-09-24

## 2019-09-24 RX ADMIN — ACETAMINOPHEN 650 MG: 325 TABLET ORAL at 19:28

## 2019-09-24 ASSESSMENT — PAIN DESCRIPTION - LOCATION: LOCATION: HEAD

## 2019-09-24 ASSESSMENT — PAIN DESCRIPTION - ORIENTATION: ORIENTATION: LEFT

## 2019-09-24 ASSESSMENT — PAIN DESCRIPTION - PAIN TYPE: TYPE: ACUTE PAIN

## 2019-09-24 ASSESSMENT — PAIN SCALES - GENERAL
PAINLEVEL_OUTOF10: 3
PAINLEVEL_OUTOF10: 3

## 2019-09-25 ENCOUNTER — TELEPHONE (OUTPATIENT)
Dept: INTERNAL MEDICINE CLINIC | Age: 84
End: 2019-09-25
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
## 2020-01-15 ENCOUNTER — HOSPITAL ENCOUNTER (OUTPATIENT)
Dept: MAMMOGRAPHY | Age: 85
Discharge: HOME | End: 2020-01-15
Payer: MEDICARE

## 2020-01-15 PROCEDURE — 77063 BREAST TOMOSYNTHESIS BI: CPT

## 2020-01-30 ENCOUNTER — OFFICE VISIT (OUTPATIENT)
Dept: INTERNAL MEDICINE CLINIC | Age: 85
End: 2020-01-30
Payer: MEDICARE

## 2020-01-30 VITALS
BODY MASS INDEX: 21.5 KG/M2 | WEIGHT: 137 LBS | RESPIRATION RATE: 12 BRPM | SYSTOLIC BLOOD PRESSURE: 120 MMHG | DIASTOLIC BLOOD PRESSURE: 80 MMHG | HEART RATE: 68 BPM | HEIGHT: 67 IN

## 2020-01-30 PROCEDURE — G8420 CALC BMI NORM PARAMETERS: HCPCS | Performed by: INTERNAL MEDICINE

## 2020-01-30 PROCEDURE — 1036F TOBACCO NON-USER: CPT | Performed by: INTERNAL MEDICINE

## 2020-01-30 PROCEDURE — 1123F ACP DISCUSS/DSCN MKR DOCD: CPT | Performed by: INTERNAL MEDICINE

## 2020-01-30 PROCEDURE — 1090F PRES/ABSN URINE INCON ASSESS: CPT | Performed by: INTERNAL MEDICINE

## 2020-01-30 PROCEDURE — G8427 DOCREV CUR MEDS BY ELIG CLIN: HCPCS | Performed by: INTERNAL MEDICINE

## 2020-01-30 PROCEDURE — 99213 OFFICE O/P EST LOW 20 MIN: CPT | Performed by: INTERNAL MEDICINE

## 2020-01-30 PROCEDURE — 4040F PNEUMOC VAC/ADMIN/RCVD: CPT | Performed by: INTERNAL MEDICINE

## 2020-01-30 PROCEDURE — G8482 FLU IMMUNIZE ORDER/ADMIN: HCPCS | Performed by: INTERNAL MEDICINE

## 2020-01-30 ASSESSMENT — PATIENT HEALTH QUESTIONNAIRE - PHQ9
2. FEELING DOWN, DEPRESSED OR HOPELESS: 0
SUM OF ALL RESPONSES TO PHQ QUESTIONS 1-9: 0
SUM OF ALL RESPONSES TO PHQ QUESTIONS 1-9: 0
1. LITTLE INTEREST OR PLEASURE IN DOING THINGS: 0
SUM OF ALL RESPONSES TO PHQ9 QUESTIONS 1 & 2: 0

## 2020-01-30 NOTE — PROGRESS NOTES
Chief Complaint   Patient presents with    Hyperlipidemia    Hypothyroidism        HPI:  Comes in for evaluation of hyprtlipidemia for which she is on simvastatin. She follows a reasinable diet and exercises. She also has hyperlipidemia for which she is on levothyroxine. She has no heat or cold intolerance, constipation or diarrhea, or pretibial linda,a. Social History     Socioeconomic History    Marital status:       Spouse name: Not on file    Number of children: 0    Years of education: Not on file    Highest education level: Not on file   Occupational History    Not on file   Social Needs    Financial resource strain: Not on file    Food insecurity:     Worry: Not on file     Inability: Not on file    Transportation needs:     Medical: Not on file     Non-medical: Not on file   Tobacco Use    Smoking status: Never Smoker    Smokeless tobacco: Never Used   Substance and Sexual Activity    Alcohol use: No     Alcohol/week: 0.0 standard drinks    Drug use: Not on file    Sexual activity: Not on file   Lifestyle    Physical activity:     Days per week: Not on file     Minutes per session: Not on file    Stress: Not on file   Relationships    Social connections:     Talks on phone: Not on file     Gets together: Not on file     Attends Judaism service: Not on file     Active member of club or organization: Not on file     Attends meetings of clubs or organizations: Not on file     Relationship status: Not on file    Intimate partner violence:     Fear of current or ex partner: Not on file     Emotionally abused: Not on file     Physically abused: Not on file     Forced sexual activity: Not on file   Other Topics Concern    Not on file   Social History Narrative    Living Will: No             Patient Active Problem List   Diagnosis    Hyperlipidemia    Hypothyroidism     Past Medical History:   Diagnosis Date    Diverticulosis     Encounter for screening mammogram for breast Multiple Vitamins-Minerals (PRESERVISION AREDS 2+MULTI VIT PO) Take by mouth daily      meclizine (ANTIVERT) 25 MG tablet TAKE 1 TABLET THREE TIMES A DAY AS NEEDED FOR VERTIGO 60 tablet 3    B Complex Vitamins (VITAMIN-B COMPLEX) TABS Take  by mouth daily.  vitamin D (CHOLECALCIFEROL) 400 UNITS TABS tablet Take 2,000 Units by mouth daily        No current facility-administered medications for this visit. Allergies   Allergen Reactions    Codeine     Epinephrine     Feldene [Piroxicam]     Other      Antidepressants    Reglan [Metoclopramide Hcl]        Physical Exam:   /80 (Site: Left Upper Arm, Position: Sitting, Cuff Size: Medium Adult)   Pulse 68   Resp 12   Ht 5' 7\" (1.702 m)   Wt 137 lb (62.1 kg)   BMI 21.46 kg/m²   General appearance: alert, appears stated age and cooperative  Lungs: clear to auscultation bilaterally  Heart: regular rate and rhythm, S1, S2 normal, no murmur, click, rub or gallop  Extremities: extremities normal, atraumatic, no cyanosis or edema  Other: N/A    Lab Review:   Orders Only on 11/26/2019   Component Date Value    Sed Rate 11/26/2019 15    Orders Only on 11/26/2019   Component Date Value    CRP 11/26/2019 1.0    Orders Only on 11/26/2019   Component Date Value    CREATININE 11/26/2019 0.7     GFR Non- 11/26/2019 >60     GFR  11/26/2019 >60    Orders Only on 11/26/2019   Component Date Value    AST 11/26/2019 18    Orders Only on 11/26/2019   Component Date Value    ALT 11/26/2019 18    Orders Only on 11/26/2019   Component Date Value    WBC 11/26/2019 9.0     RBC 11/26/2019 4.61     Hemoglobin 11/26/2019 12.6     Hematocrit 11/26/2019 38.0     MCV 11/26/2019 82.5     MCH 11/26/2019 27.4     MCHC 11/26/2019 33.2     RDW 11/26/2019 16.7*    Platelets 32/67/8807 204     MPV 11/26/2019 8.6          Assessment: Hyperlipidemia                         Hypothyroidism    Plan:               Same regimen.   To come in

## 2020-05-14 LAB
ANION GAP SERPL CALCULATED.3IONS-SCNC: 10 MMOL/L (ref 3–16)
BUN BLDV-MCNC: 25 MG/DL (ref 7–20)
CALCIUM SERPL-MCNC: 9.3 MG/DL (ref 8.3–10.6)
CHLORIDE BLD-SCNC: 104 MMOL/L (ref 99–110)
CO2: 27 MMOL/L (ref 21–32)
CREAT SERPL-MCNC: 0.6 MG/DL (ref 0.6–1.2)
GFR AFRICAN AMERICAN: >60
GFR NON-AFRICAN AMERICAN: >60
GLUCOSE BLD-MCNC: 81 MG/DL (ref 70–99)
MAGNESIUM: 2.1 MG/DL (ref 1.8–2.4)
POTASSIUM SERPL-SCNC: 4.3 MMOL/L (ref 3.5–5.1)
SODIUM BLD-SCNC: 141 MMOL/L (ref 136–145)
VITAMIN D 25-HYDROXY: 60.6 NG/ML

## 2020-06-05 RX ORDER — SIMVASTATIN 20 MG
TABLET ORAL
Qty: 90 TABLET | Refills: 3 | Status: SHIPPED | OUTPATIENT
Start: 2020-06-05

## 2020-07-24 RX ORDER — LEVOTHYROXINE SODIUM 0.03 MG/1
TABLET ORAL
Qty: 90 TABLET | Refills: 3 | Status: SHIPPED | OUTPATIENT
Start: 2020-07-24

## 2020-07-27 ENCOUNTER — OFFICE VISIT (OUTPATIENT)
Dept: INTERNAL MEDICINE CLINIC | Age: 85
End: 2020-07-27
Payer: MEDICARE

## 2020-07-27 VITALS
WEIGHT: 140.6 LBS | HEART RATE: 76 BPM | BODY MASS INDEX: 22.07 KG/M2 | SYSTOLIC BLOOD PRESSURE: 124 MMHG | HEIGHT: 67 IN | RESPIRATION RATE: 12 BRPM | DIASTOLIC BLOOD PRESSURE: 74 MMHG

## 2020-07-27 PROCEDURE — G0439 PPPS, SUBSEQ VISIT: HCPCS | Performed by: INTERNAL MEDICINE

## 2020-07-27 PROCEDURE — 93000 ELECTROCARDIOGRAM COMPLETE: CPT | Performed by: INTERNAL MEDICINE

## 2020-07-27 PROCEDURE — 1123F ACP DISCUSS/DSCN MKR DOCD: CPT | Performed by: INTERNAL MEDICINE

## 2020-07-27 PROCEDURE — 4040F PNEUMOC VAC/ADMIN/RCVD: CPT | Performed by: INTERNAL MEDICINE

## 2020-07-27 ASSESSMENT — PATIENT HEALTH QUESTIONNAIRE - PHQ9
SUM OF ALL RESPONSES TO PHQ QUESTIONS 1-9: 0
SUM OF ALL RESPONSES TO PHQ QUESTIONS 1-9: 0

## 2020-07-27 ASSESSMENT — LIFESTYLE VARIABLES: HOW OFTEN DO YOU HAVE A DRINK CONTAINING ALCOHOL: 0

## 2020-07-27 NOTE — PROGRESS NOTES
Zaria Harris Schirmer 80 y.o. presents today with   Chief Complaint   Patient presents with    Medicare AWV       Family History   Problem Relation Age of Onset    Other Mother 80        , polycythemia vera.  Other Father 80        , ASHD. Social History     Socioeconomic History    Marital status:       Spouse name: Not on file    Number of children: 0    Years of education: Not on file    Highest education level: Not on file   Occupational History    Not on file   Social Needs    Financial resource strain: Not on file    Food insecurity     Worry: Not on file     Inability: Not on file    Transportation needs     Medical: Not on file     Non-medical: Not on file   Tobacco Use    Smoking status: Never Smoker    Smokeless tobacco: Never Used   Substance and Sexual Activity    Alcohol use: No     Alcohol/week: 0.0 standard drinks    Drug use: Not on file    Sexual activity: Not on file   Lifestyle    Physical activity     Days per week: Not on file     Minutes per session: Not on file    Stress: Not on file   Relationships    Social connections     Talks on phone: Not on file     Gets together: Not on file     Attends Denominational service: Not on file     Active member of club or organization: Not on file     Attends meetings of clubs or organizations: Not on file     Relationship status: Not on file    Intimate partner violence     Fear of current or ex partner: Not on file     Emotionally abused: Not on file     Physically abused: Not on file     Forced sexual activity: Not on file   Other Topics Concern    Not on file   Social History Narrative    Living Will: No               Patient Active Problem List   Diagnosis    Hyperlipidemia    Hypothyroidism       Past Medical History:   Diagnosis Date    Diverticulosis     Encounter for screening mammogram for breast cancer 2015    Benign    GERD (gastroesophageal reflux disease)     Hyperlipidemia     Hypothyroidism 06/20/2016    Subclinical    Labyrinthitis     Macular degeneration     Osteopenia DEXA - June, 2010    Lumbar T score -1.2 and Hip T score -1.2    Osteopenia DEXA - Jan., 2013    Lumbar T score -1.2 and Hip T score -1.1    Other screening mammogram January 10, 2013    Benign    Other screening mammogram July 10, 2014    Negative    Rotator cuff tear, right Nov., 2015    Right by MRI    Screening mammogram for high-risk patient January 7, 2011    Negative    Screening mammogram for high-risk patient *January 15, 2920    Benign    Screening mammogram, encounter for *October 9, 2015    Benign    Screening mammogram, encounter for *October 25, 2016    Benign    Screening mammogram, encounter for *Oct. 30, 2017    Negative    Screening mammogram, encounter for *November 16, 2018    Benign    Spinal stenosis     Tubular adenoma     Vitamin D deficiency disease 2013    Level - 24        Past Surgical History:   Procedure Laterality Date    APPENDECTOMY      COLONOSCOPY  July, 2009 ( 2014 )    Dr. Pam Sevilla - left sided diverticulosis     COLONOSCOPY  *Apr., 2017 ( prn )    Dr. Carla Rodriguez - diverticulosis, tubular adenoma    COLONOSCOPY  *April 11, 2017 ( prn )    Dr. Carla Rodriguez - tubular adenomata ( 3 )    EYE SURGERY  June, 2007    bilateral cataract extraction    UPPER GASTROINTESTINAL ENDOSCOPY  July, 2009    Dr. Pam Sevilla - small sliding hiatus hernia    UPPER GASTROINTESTINAL ENDOSCOPY  Feb., 2012    Dr. Jaren Castrejon - small stomach polyp   Manishn Flight, 2015    Dr. Key Daly -        Current Outpatient Medications   Medication Sig Dispense Refill    Omeprazole 20 MG TBDD Take by mouth      levothyroxine (SYNTHROID) 25 MCG tablet TAKE 1 TABLET DAILY 90 tablet 3    simvastatin (ZOCOR) 20 MG tablet TAKE 1 TABLET EVERY EVENING*ACCORD MFR* 90 tablet 3    B Complex Vitamins (VITAMIN-B COMPLEX) TABS Take  by mouth daily.       vitamin D (CHOLECALCIFEROL) 400 UNITS TABS normal. Septum midline. Mucosa normal. No drainage or sinus tenderness. Throat: N/A  Neck: no adenopathy, no carotid bruit, no JVD, supple, symmetrical, trachea midline and thyroid not enlarged, symmetric, no tenderness/mass/nodules  Nodes:no adenopathy palpable  Breasts:Breasts symmetrical, skin without lesion(s), no nipple retraction or dimpling, no nipple discharge, no masses palpated, no axillary or supraclavicular adenopathy  Lungs: clear to auscultation bilaterally  Heart: regular rate and rhythm, S1, S2 normal, no murmur, click, rub or gallop  Abdomen: soft, non-tender; bowel sounds normal; no masses,  no organomegaly  Extremities: extremities normal, atraumatic, no cyanosis or edema  Neurological: Gait normal. Reflexes normal and symmetric. Sensation grossly normal  Pulse: radial=4/4, femoral=4/4, popliteal=4/4, dorsalis pedis=4/4,   Skin: normal coloration and turgor, no rashes, no suspicious skin lesions noted  Psych: normal    Lab Review: not applicable  Assessment: Stable    Plan:              ECG, UA and fasting labs, and hemoccults  Hyperlipidemia - await labs, same regimen pending results    Hypothyroidism - await labs, same regimen pending results    Advised to make a dental appointment  Vision - ongoing secondary to macular degeneration, continue regular eye treatments  Patient advised to fasten and secure all throw rugs       JOSE MANUEL Rebolledo MD

## 2020-08-06 DIAGNOSIS — E78.5 HYPERLIPIDEMIA, UNSPECIFIED HYPERLIPIDEMIA TYPE: ICD-10-CM

## 2020-08-06 DIAGNOSIS — E03.9 HYPOTHYROIDISM, UNSPECIFIED TYPE: ICD-10-CM

## 2020-08-06 DIAGNOSIS — Z13.0 SCREENING FOR DEFICIENCY ANEMIA: ICD-10-CM

## 2020-08-06 LAB
A/G RATIO: 2.3 (ref 1.1–2.2)
ALBUMIN SERPL-MCNC: 4.4 G/DL (ref 3.4–5)
ALP BLD-CCNC: 46 U/L (ref 40–129)
ALT SERPL-CCNC: 14 U/L (ref 10–40)
ANION GAP SERPL CALCULATED.3IONS-SCNC: 11 MMOL/L (ref 3–16)
AST SERPL-CCNC: 21 U/L (ref 15–37)
BASOPHILS ABSOLUTE: 0.1 K/UL (ref 0–0.2)
BASOPHILS RELATIVE PERCENT: 1.3 %
BILIRUB SERPL-MCNC: 0.4 MG/DL (ref 0–1)
BILIRUBIN URINE: NEGATIVE
BLOOD, URINE: NEGATIVE
BUN BLDV-MCNC: 20 MG/DL (ref 7–20)
CALCIUM SERPL-MCNC: 9.4 MG/DL (ref 8.3–10.6)
CHLORIDE BLD-SCNC: 104 MMOL/L (ref 99–110)
CHOLESTEROL, TOTAL: 173 MG/DL (ref 0–199)
CLARITY: CLEAR
CO2: 28 MMOL/L (ref 21–32)
COLOR: YELLOW
CREAT SERPL-MCNC: 0.7 MG/DL (ref 0.6–1.2)
EOSINOPHILS ABSOLUTE: 0.1 K/UL (ref 0–0.6)
EOSINOPHILS RELATIVE PERCENT: 1.8 %
GFR AFRICAN AMERICAN: >60
GFR NON-AFRICAN AMERICAN: >60
GLOBULIN: 1.9 G/DL
GLUCOSE BLD-MCNC: 97 MG/DL (ref 70–99)
GLUCOSE URINE: NEGATIVE MG/DL
HCT VFR BLD CALC: 37.6 % (ref 36–48)
HDLC SERPL-MCNC: 60 MG/DL (ref 40–60)
HEMOGLOBIN: 12.7 G/DL (ref 12–16)
KETONES, URINE: NEGATIVE MG/DL
LDL CHOLESTEROL CALCULATED: 96 MG/DL
LEUKOCYTE ESTERASE, URINE: NEGATIVE
LYMPHOCYTES ABSOLUTE: 2.2 K/UL (ref 1–5.1)
LYMPHOCYTES RELATIVE PERCENT: 29.9 %
MCH RBC QN AUTO: 27.4 PG (ref 26–34)
MCHC RBC AUTO-ENTMCNC: 33.6 G/DL (ref 31–36)
MCV RBC AUTO: 81.4 FL (ref 80–100)
MICROSCOPIC EXAMINATION: NORMAL
MONOCYTES ABSOLUTE: 0.5 K/UL (ref 0–1.3)
MONOCYTES RELATIVE PERCENT: 6.7 %
NEUTROPHILS ABSOLUTE: 4.4 K/UL (ref 1.7–7.7)
NEUTROPHILS RELATIVE PERCENT: 60.3 %
NITRITE, URINE: NEGATIVE
PDW BLD-RTO: 14 % (ref 12.4–15.4)
PH UA: 6.5 (ref 5–8)
PLATELET # BLD: 175 K/UL (ref 135–450)
PMV BLD AUTO: 9.4 FL (ref 5–10.5)
POTASSIUM SERPL-SCNC: 5.2 MMOL/L (ref 3.5–5.1)
PROTEIN UA: NEGATIVE MG/DL
RBC # BLD: 4.62 M/UL (ref 4–5.2)
SODIUM BLD-SCNC: 143 MMOL/L (ref 136–145)
SPECIFIC GRAVITY UA: 1.01 (ref 1–1.03)
TOTAL CK: 98 U/L (ref 26–192)
TOTAL PROTEIN: 6.3 G/DL (ref 6.4–8.2)
TRIGL SERPL-MCNC: 83 MG/DL (ref 0–150)
TSH SERPL DL<=0.05 MIU/L-ACNC: 3.85 UIU/ML (ref 0.27–4.2)
URINE TYPE: NORMAL
UROBILINOGEN, URINE: 0.2 E.U./DL
VLDLC SERPL CALC-MCNC: 17 MG/DL
WBC # BLD: 7.2 K/UL (ref 4–11)

## 2020-09-10 ENCOUNTER — TELEPHONE (OUTPATIENT)
Dept: INTERNAL MEDICINE CLINIC | Age: 85
End: 2020-09-10

## 2020-09-15 NOTE — TELEPHONE ENCOUNTER
Gives you her love. The letter said you would recommend someone. Who do you like for her?
Message given to patient.
no indicators present

## 2021-01-13 ENCOUNTER — HOSPITAL ENCOUNTER (OUTPATIENT)
Dept: GENERAL RADIOLOGY | Age: 86
Discharge: HOME | End: 2021-01-13
Payer: MEDICARE

## 2021-01-13 DIAGNOSIS — E28.39 ESTROGEN DEFICIENCY: ICD-10-CM

## 2021-01-13 PROCEDURE — 77080 DXA BONE DENSITY AXIAL: CPT

## 2021-01-16 LAB
PARATHYROID HORMONE INTACT: 48.9 PG/ML (ref 14–72)
VITAMIN D 25-HYDROXY: 59.4 NG/ML

## 2021-03-25 ENCOUNTER — HOSPITAL ENCOUNTER (OUTPATIENT)
Dept: MAMMOGRAPHY | Age: 86
Discharge: HOME | End: 2021-03-25
Payer: MEDICARE

## 2021-03-25 DIAGNOSIS — Z12.31 VISIT FOR SCREENING MAMMOGRAM: ICD-10-CM

## 2021-03-25 PROCEDURE — 77063 BREAST TOMOSYNTHESIS BI: CPT

## 2021-03-25 PROCEDURE — 77067 SCR MAMMO BI INCL CAD: CPT

## 2021-07-03 ENCOUNTER — APPOINTMENT (OUTPATIENT)
Dept: CT IMAGING | Age: 86
End: 2021-07-03
Payer: MEDICARE

## 2021-07-03 ENCOUNTER — HOSPITAL ENCOUNTER (EMERGENCY)
Age: 86
Discharge: HOME | End: 2021-07-03
Attending: EMERGENCY MEDICINE
Payer: MEDICARE

## 2021-07-03 VITALS
HEIGHT: 67 IN | OXYGEN SATURATION: 97 % | TEMPERATURE: 97.6 F | BODY MASS INDEX: 23.07 KG/M2 | DIASTOLIC BLOOD PRESSURE: 82 MMHG | SYSTOLIC BLOOD PRESSURE: 173 MMHG | HEART RATE: 98 BPM | RESPIRATION RATE: 20 BRPM | WEIGHT: 147 LBS

## 2021-07-03 DIAGNOSIS — R04.0 EPISTAXIS DUE TO TRAUMA: ICD-10-CM

## 2021-07-03 DIAGNOSIS — S02.2XXA CLOSED FRACTURE OF NASAL BONE, INITIAL ENCOUNTER: Primary | ICD-10-CM

## 2021-07-03 PROCEDURE — 70486 CT MAXILLOFACIAL W/O DYE: CPT

## 2021-07-03 PROCEDURE — 70450 CT HEAD/BRAIN W/O DYE: CPT

## 2021-07-03 PROCEDURE — 99284 EMERGENCY DEPT VISIT MOD MDM: CPT

## 2021-07-03 PROCEDURE — 72125 CT NECK SPINE W/O DYE: CPT

## 2021-07-03 RX ORDER — ONDANSETRON 4 MG/1
4 TABLET, FILM COATED ORAL EVERY 8 HOURS PRN
Qty: 20 TABLET | Refills: 0 | Status: SHIPPED | OUTPATIENT
Start: 2021-07-03 | End: 2021-07-08

## 2021-07-03 RX ORDER — OXYMETAZOLINE HYDROCHLORIDE 0.05 G/100ML
2 SPRAY NASAL ONCE
Status: DISCONTINUED | OUTPATIENT
Start: 2021-07-03 | End: 2021-07-04 | Stop reason: HOSPADM

## 2021-07-03 ASSESSMENT — ENCOUNTER SYMPTOMS
VOMITING: 0
COLOR CHANGE: 0
SHORTNESS OF BREATH: 0
SINUS PAIN: 0
PHOTOPHOBIA: 0
NAUSEA: 0

## 2021-07-04 NOTE — ED PROVIDER NOTES
stenosis, Tubular adenoma, and Vitamin D deficiency disease. She has a past surgical history that includes Eye surgery (June, 2007); Appendectomy; Upper gastrointestinal endoscopy (July, 2009); Colonoscopy (July, 2009 ( 2014 )); Upper gastrointestinal endoscopy (Feb., 2012); Upper gastrointestinal endoscopy (Febr.23, 2015); Colonoscopy (*Apr., 2017 ( prn )); and Colonoscopy (*April 11, 2017 ( prn )). Her family history includes Other (age of onset: 80) in her mother; Other (age of onset: 80) in her father. She reports that she has never smoked. She has never used smokeless tobacco. She reports that she does not drink alcohol. Medications     Previous Medications    B COMPLEX VITAMINS (VITAMIN-B COMPLEX) TABS    Take  by mouth daily. LEVOTHYROXINE (SYNTHROID) 25 MCG TABLET    TAKE 1 TABLET DAILY    MECLIZINE (ANTIVERT) 25 MG TABLET    TAKE 1 TABLET THREE TIMES A DAY AS NEEDED FOR VERTIGO    MULTIPLE VITAMINS-MINERALS (PRESERVISION AREDS 2+MULTI VIT PO)    Take by mouth daily    OMEPRAZOLE 20 MG TBDD    Take by mouth    SIMVASTATIN (ZOCOR) 20 MG TABLET    TAKE 1 TABLET EVERY EVENING*ACCORD MFR*    VITAMIN D (CHOLECALCIFEROL) 400 UNITS TABS TABLET    Take 2,000 Units by mouth daily        Allergies     She is allergic to codeine, epinephrine, feldene [piroxicam], other, and reglan [metoclopramide hcl]. Physical Exam     INITIAL VITALS: BP: (!) 173/82, Temp: 97.6 °F (36.4 °C), Pulse: 98, Resp: 20, SpO2: 97 %   Physical Exam  Vitals and nursing note reviewed. Constitutional:       General: She is not in acute distress. Appearance: She is well-developed. HENT:      Head: Raccoon eyes present. No Mcgill's sign or laceration. Comments: Periorbital ecchymosis bilaterally, widening of nasal bridge. Right Ear: No hemotympanum. Left Ear: No hemotympanum.       Ears:      Comments: Bilateral tympanic membranes are dark in appearance, without obvious hemorrhage     Nose:      Right Nostril: Epistaxis present. No septal hematoma. Left Nostril: Epistaxis present. No septal hematoma. Right Turbinates: Swollen. Left Turbinates: Swollen. Eyes:      Pupils: Pupils are equal, round, and reactive to light. Cardiovascular:      Rate and Rhythm: Normal rate and regular rhythm. Heart sounds: No murmur heard. No friction rub. No gallop. Pulmonary:      Effort: Pulmonary effort is normal. No respiratory distress. Breath sounds: Normal breath sounds. No stridor. No wheezing, rhonchi or rales. Chest:      Chest wall: No tenderness. Abdominal:      General: There is no distension. Palpations: Abdomen is soft. Tenderness: There is no abdominal tenderness. There is no guarding or rebound. Musculoskeletal:      Cervical back: Neck supple. Right knee: No ecchymosis. Left knee: Ecchymosis present. No lacerations, bony tenderness or crepitus. Normal range of motion. No tenderness. Normal alignment. Comments: Pelvis stable   Skin:     General: Skin is warm and dry. Capillary Refill: Capillary refill takes less than 2 seconds. Findings: No erythema. Neurological:      Mental Status: She is alert and oriented to person, place, and time. GCS: GCS eye subscore is 4. GCS verbal subscore is 5. GCS motor subscore is 6. Cranial Nerves: No cranial nerve deficit or dysarthria. Sensory: Sensation is intact. Motor: Motor function is intact. Gait: Gait is intact. Psychiatric:         Behavior: Behavior normal.         DiagnosticResults     EKG   Interpreted in conjunction with emergencydepartment physician No att. providers found  No EKG obtained    RADIOLOGY:  CT FACIAL BONES WO CONTRAST   Final Result      1. Bilateral nasal bone fractures and nasal septum fracture   2. No additional facial bone fracture or orbit injury             CT Cervical Spine WO Contrast   Final Result      1. No fracture or acute appearing malalignment   2. Advanced degenerative spondylosis            CT Head WO Contrast   Final Result      1. No intracranial hemorrhage or skull fracture   2. Bilateral nasal bone and nasal septum fractures; no additional facial bone fracture   3. No post septal orbit soft tissue abnormality   4. Anterior facial swelling centered at the nasal bone fractures          LABS:   No results found for this visit on 07/03/21. ED BEDSIDE ULTRASOUND:  N/a     RECENT VITALS:  BP: (!) 173/82, Temp: 97.6 °F (36.4 °C), Pulse: 98,Resp: 20, SpO2: 97 %     Procedures     N/a     ED Course     Nursing Notes, Past Medical Hx, Past Surgical Hx, Social Hx, Allergies, and Family Hx were reviewed. The patient was given the followingmedications:  Orders Placed This Encounter   Medications    oxymetazoline (AFRIN) 0.05 % nasal spray 2 spray    ondansetron (ZOFRAN) 4 MG tablet     Sig: Take 1 tablet by mouth every 8 hours as needed for Nausea     Dispense:  20 tablet     Refill:  0       CONSULTS:  None    MEDICAL DECISION MAKING / ASSESSMENT / Keara Contreras is a 80 y.o. female with no relevant past medical history who presents after a fall and subsequent epistaxis. On exam, she is noted to have significant bilateral periorbital ecchymosis, but no proptosis or difficulties with extraocular movement. Clinically, she has no signs of entrapment. Given the significant maxillofacial instability as well as questionable exam of her tympanic membrane's, the decision was made to proceed with cross-sectional imaging of the head, C-spine, and max face. No additional signs of trauma on exam aside from a small bruise over the left knee with full ROM. This was notable for bilateral nasal bone fractures and a nasal septal fracture. She has no clinical evidence of mastoid sign or clear rhinorrhea, and no additional fractures noted on cross-sectional imaging.   The patient's epistaxis was controlled with oxymetazoline spray, and she did not require any additional interventions to control her hemorrhage. She has a reassuring neurologic exam and has remained neurologically stable during her entire emergency department course. Patient was sent home with a bottle of Afrin as well as as needed Zofran for sinus precautions. She was given a referral to ENT on-call and was ultimately deemed appropriate for discharge to home. This patient was also evaluated by the attending physician. All care plans werediscussed and agreed upon. Clinical Impression     1. Closed fracture of nasal bone, initial encounter    2. Epistaxis due to trauma        Disposition     PATIENT REFERRED TO:  MD SHERRY Dupont/ Clarita Victoria Los marlenos [address] [place] [id number]            DISCHARGE MEDICATIONS:  New Prescriptions    ONDANSETRON (ZOFRAN) 4 MG TABLET    Take 1 tablet by mouth every 8 hours as needed for Nausea       DISPOSITION Decision To Discharge 07/03/2021 11:27:46 PM        Savana Hoff MD  Resident  07/03/21 [id number]

## 2021-10-29 ENCOUNTER — APPOINTMENT (OUTPATIENT)
Dept: GENERAL RADIOLOGY | Age: 86
End: 2021-10-29
Payer: MEDICARE

## 2021-10-29 ENCOUNTER — HOSPITAL ENCOUNTER (EMERGENCY)
Age: 86
Discharge: HOME | End: 2021-10-29
Attending: EMERGENCY MEDICINE
Payer: MEDICARE

## 2021-10-29 VITALS
HEART RATE: 103 BPM | RESPIRATION RATE: 18 BRPM | SYSTOLIC BLOOD PRESSURE: 173 MMHG | TEMPERATURE: 98.2 F | OXYGEN SATURATION: 94 % | DIASTOLIC BLOOD PRESSURE: 71 MMHG

## 2021-10-29 DIAGNOSIS — S42.302A CLOSED FRACTURE OF SHAFT OF LEFT HUMERUS, UNSPECIFIED FRACTURE MORPHOLOGY, INITIAL ENCOUNTER: Primary | ICD-10-CM

## 2021-10-29 PROCEDURE — 6360000002 HC RX W HCPCS: Performed by: PHYSICIAN ASSISTANT

## 2021-10-29 PROCEDURE — 96374 THER/PROPH/DIAG INJ IV PUSH: CPT

## 2021-10-29 PROCEDURE — 73060 X-RAY EXAM OF HUMERUS: CPT

## 2021-10-29 PROCEDURE — 73030 X-RAY EXAM OF SHOULDER: CPT

## 2021-10-29 PROCEDURE — 6370000000 HC RX 637 (ALT 250 FOR IP): Performed by: PHYSICIAN ASSISTANT

## 2021-10-29 PROCEDURE — 6360000002 HC RX W HCPCS

## 2021-10-29 PROCEDURE — 90471 IMMUNIZATION ADMIN: CPT | Performed by: PHYSICIAN ASSISTANT

## 2021-10-29 PROCEDURE — 90715 TDAP VACCINE 7 YRS/> IM: CPT | Performed by: PHYSICIAN ASSISTANT

## 2021-10-29 PROCEDURE — 99284 EMERGENCY DEPT VISIT MOD MDM: CPT

## 2021-10-29 PROCEDURE — 96375 TX/PRO/DX INJ NEW DRUG ADDON: CPT

## 2021-10-29 PROCEDURE — 96376 TX/PRO/DX INJ SAME DRUG ADON: CPT

## 2021-10-29 PROCEDURE — 73080 X-RAY EXAM OF ELBOW: CPT

## 2021-10-29 RX ORDER — ONDANSETRON 4 MG/1
4 TABLET, ORALLY DISINTEGRATING ORAL EVERY 8 HOURS PRN
Qty: 20 TABLET | Refills: 0 | Status: SHIPPED | OUTPATIENT
Start: 2021-10-29

## 2021-10-29 RX ORDER — OXYCODONE HYDROCHLORIDE 5 MG/1
5 TABLET ORAL EVERY 6 HOURS PRN
Qty: 12 TABLET | Refills: 0 | Status: SHIPPED | OUTPATIENT
Start: 2021-10-29 | End: 2021-11-01

## 2021-10-29 RX ORDER — ONDANSETRON 2 MG/ML
4 INJECTION INTRAMUSCULAR; INTRAVENOUS ONCE
Status: COMPLETED | OUTPATIENT
Start: 2021-10-29 | End: 2021-10-29

## 2021-10-29 RX ORDER — OXYCODONE HYDROCHLORIDE 5 MG/1
5 TABLET ORAL ONCE
Status: DISCONTINUED | OUTPATIENT
Start: 2021-10-29 | End: 2021-10-29

## 2021-10-29 RX ORDER — MORPHINE SULFATE 4 MG/ML
4 INJECTION, SOLUTION INTRAMUSCULAR; INTRAVENOUS ONCE
Status: COMPLETED | OUTPATIENT
Start: 2021-10-29 | End: 2021-10-29

## 2021-10-29 RX ORDER — ACETAMINOPHEN 325 MG/1
325 TABLET ORAL EVERY 6 HOURS PRN
Qty: 60 TABLET | Refills: 0 | Status: SHIPPED | OUTPATIENT
Start: 2021-10-29

## 2021-10-29 RX ORDER — ACETAMINOPHEN 325 MG/1
650 TABLET ORAL ONCE
Status: COMPLETED | OUTPATIENT
Start: 2021-10-29 | End: 2021-10-29

## 2021-10-29 RX ORDER — ONDANSETRON 2 MG/ML
INJECTION INTRAMUSCULAR; INTRAVENOUS
Status: COMPLETED
Start: 2021-10-29 | End: 2021-10-29

## 2021-10-29 RX ORDER — MORPHINE SULFATE 2 MG/ML
2 INJECTION, SOLUTION INTRAMUSCULAR; INTRAVENOUS ONCE
Status: COMPLETED | OUTPATIENT
Start: 2021-10-29 | End: 2021-10-29

## 2021-10-29 RX ADMIN — TETANUS TOXOID, REDUCED DIPHTHERIA TOXOID AND ACELLULAR PERTUSSIS VACCINE, ADSORBED 0.5 ML: 5; 2.5; 8; 8; 2.5 SUSPENSION INTRAMUSCULAR at 20:55

## 2021-10-29 RX ADMIN — ONDANSETRON 4 MG: 2 INJECTION INTRAMUSCULAR; INTRAVENOUS at 21:00

## 2021-10-29 RX ADMIN — ACETAMINOPHEN 650 MG: 325 TABLET ORAL at 22:02

## 2021-10-29 RX ADMIN — MORPHINE SULFATE 2 MG: 2 INJECTION, SOLUTION INTRAMUSCULAR; INTRAVENOUS at 18:48

## 2021-10-29 RX ADMIN — MORPHINE SULFATE 4 MG: 4 INJECTION INTRAVENOUS at 20:55

## 2021-10-29 ASSESSMENT — PAIN DESCRIPTION - PAIN TYPE: TYPE: ACUTE PAIN

## 2021-10-29 ASSESSMENT — ENCOUNTER SYMPTOMS
BACK PAIN: 0
ABDOMINAL PAIN: 0
CHEST TIGHTNESS: 0
SHORTNESS OF BREATH: 0

## 2021-10-29 ASSESSMENT — PAIN DESCRIPTION - ORIENTATION: ORIENTATION: LEFT

## 2021-10-29 ASSESSMENT — PAIN SCALES - GENERAL
PAINLEVEL_OUTOF10: 9
PAINLEVEL_OUTOF10: 8
PAINLEVEL_OUTOF10: 3

## 2021-10-29 ASSESSMENT — PAIN DESCRIPTION - LOCATION: LOCATION: SHOULDER

## 2021-10-29 NOTE — ED TRIAGE NOTES
Pt slipped on mud and fell outside. Pt c/o bilateral shoulder pain, however, is worse on the L. PIV placed.   VSS

## 2021-10-29 NOTE — ED PROVIDER NOTES
[address] [facility] [address] ENCOUNTER          PHYSICIAN ASSISTANT NOTE       Date of evaluation: 10/29/2021    Chief Complaint     Left arm and right shoulder pain after a fall. History of Present Illness     Nicki Crew is a 80 y.o. female who presents to the emergency department with left arm and right shoulder pain after mechanical fall. The patient states she was walking out of her house down the steps when she slipped on mud falling landing and hitting her left arm. She states she was able to crawl to the step and get herself up to walk to a neighbor's house. She denies head injury or loss of consciousness. Denies neck or back pain. Denies chest pain, shortness of breath or abdominal pain. Denies blurry or double vision. She is right-handed dominant denies numbness or tingling of the extremity but does have pain that she describes as a sharp, throbbing pain rating it an 8 out of 10. She states \"I feel like my arm is going into different directions\". Review of Systems     Review of Systems   Constitutional: Negative for chills and fever. HENT: Negative. Respiratory: Negative for chest tightness and shortness of breath. Cardiovascular: Negative. Negative for chest pain, palpitations and leg swelling. Gastrointestinal: Negative for abdominal pain. Genitourinary: Negative. Musculoskeletal: Negative for back pain and neck pain. Positive for left arm pain and injury   Skin: Positive for wound. Negative for rash. Positive for a less than quarter centimeter abrasion to the left elbow. Neurological: Negative. Negative for dizziness, syncope, weakness, light-headedness and headaches. Psychiatric/Behavioral: Negative.         Past Medical, Surgical, Family, and Social History     She has a past medical history of Diverticulosis, Encounter for screening mammogram for breast cancer, GERD (gastroesophageal reflux disease), Hyperlipidemia, not in acute distress. Appearance: Normal appearance. She is well-developed. HENT:      Head: Normocephalic and atraumatic. Right Ear: External ear normal.      Left Ear: External ear normal.      Nose: Nose normal.      Mouth/Throat:      Mouth: Mucous membranes are moist.   Eyes:      General:         Right eye: No discharge. Left eye: No discharge. Extraocular Movements: Extraocular movements intact. Conjunctiva/sclera: Conjunctivae normal.      Pupils: Pupils are equal, round, and reactive to light. Cardiovascular:      Rate and Rhythm: Regular rhythm. Pulses: Normal pulses. Heart sounds: No murmur heard. Comments: Mildly tachycardic rate  Pulmonary:      Effort: Pulmonary effort is normal.      Breath sounds: Normal breath sounds. No wheezing or rhonchi. Abdominal:      General: Bowel sounds are normal.      Palpations: Abdomen is soft. Tenderness: There is no abdominal tenderness. There is no guarding or rebound. Musculoskeletal:         General: Normal range of motion. Cervical back: Normal range of motion and neck supple. Comments: Still pulses 2+ bilaterally of upper and lower extremities. She is moving all extremities without difficulty except her left upper extremity in which she is refusing to move secondary to pain. On examination of the left upper extremity she has 2+ radial and ulnar pulses with intact radial, median and ulnar nerves to motor and sensory function. She has tenderness to palpation that starts along the left shoulder down the humerus with palpable fracture noted as there is a step-off along the mid humeral shaft. She has a superficial abrasion at the elbow with no abrasions at the region of the fracture. She denies tenderness to palpation of the midline cervical, thoracic and lumbar spine. She does have some slight tenderness along the right shoulder with no obvious deformities or dislocations of either shoulder. Brisk capillary refill. Skin:     General: Skin is warm and dry. Capillary Refill: Capillary refill takes less than 2 seconds. Neurological:      General: No focal deficit present. Mental Status: She is alert and oriented to person, place, and time. Sensory: No sensory deficit. Deep Tendon Reflexes: Reflexes normal.      Comments: 5 out of 5 strength of bilateral upper and lower extremities. Intact sensation throughout. The patient is able to ambulate without difficulty. Psychiatric:         Mood and Affect: Mood normal.         Behavior: Behavior normal.         Thought Content: Thought content normal.         Judgment: Judgment normal.         Diagnostic Results       RADIOLOGY:  XR HUMERUS LEFT (MIN 2 VIEWS)   Final Result   Impression:   Displaced mid humeral shaft fracture. XR SHOULDER LEFT (MIN 2 VIEWS)   Final Result   Impression:   No acute osseous injury at the left shoulder. XR ELBOW LEFT (MIN 3 VIEWS)   Final Result   Impression:   No acute osseous injury. XR SHOULDER RIGHT (MIN 2 VIEWS)   Final Result   Impression:   No acute osseous injury. LABS:   No results found for this visit on 10/29/21. RECENT VITALS:  BP: (!) 173/71, Temp: 98.2 °F (36.8 °C), Pulse: 103, Resp: 18, SpO2: 94 %     Procedures         ED Course     Nursing Notes, Past Medical Hx,Past Surgical Hx, Social Hx, Allergies, and Family Hx were reviewed.     The patient was given the following medications:  Orders Placed This Encounter   Medications    morphine (PF) injection 2 mg    Tetanus-Diphth-Acell Pertussis (BOOSTRIX) injection 0.5 mL    morphine injection 4 mg    ondansetron (ZOFRAN) 4 MG/2ML injection     JESSE WHALEN: cabinet override    ondansetron (ZOFRAN) injection 4 mg    DISCONTD: oxyCODONE (ROXICODONE) immediate release tablet 5 mg    oxyCODONE (ROXICODONE) 5 MG immediate release tablet     Sig: Take 1 tablet by mouth every 6 hours as needed for Pain for up to 3 days. WARNING: May cause drowsiness. May impair ability to operate a motor vehicle or machinery. Do not use in combination with alcohol     Dispense:  12 tablet     Refill:  0    acetaminophen (TYLENOL) 325 MG tablet     Sig: Take 1 tablet by mouth every 6 hours as needed for Pain     Dispense:  60 tablet     Refill:  0    ondansetron (ZOFRAN ODT) 4 MG disintegrating tablet     Sig: Take 1 tablet by mouth every 8 hours as needed for Nausea     Dispense:  20 tablet     Refill:  0    acetaminophen (TYLENOL) tablet 650 mg       CONSULTS:  [address] [facility] / ASSESSMENT / Erin Navin is a 80 y.o. female who presented to the emergency department with left arm pain after mechanical fall. She is neurovascularly intact on examination. On examination of the left arm she has palpable step-off along the mid humeral shaft with no open wounds in that region. She was given 2 mg of morphine IV initially for her pain with minimal relief and therefore was given another 4 mg IV which she tolerated well. She had multiple x-rays of both shoulders as well as her left humerus and elbow. X-ray of the left humerus shows a displaced mid humeral shaft fracture. My attending physician spoke with the orthopedic surgeon on-call Dr. Krystina Whyte. The patient was placed in a coaptation splint and was given a sling for comfort. She will be discharged home to follow-up with orthopedic surgery next week. I did write a prescription for oxycodone as well as Tylenol and Zofran. The patient was reluctant to take the prescription for oxycodone but she took it in case she has worsening pain not relieved by Tylenol. She was warned that this is a narcotic and can cause drowsiness. She is to keep the splint on and dry until follow-up with orthopedic surgery however is to return for worsening symptoms or concerns.     Post splint placement the patient has intact distal pulses with brisk capillary refill of her left upper extremity. This patient was also evaluated by the attending physician. All care plans were discussed and agreed upon. Clinical Impression     1. Closed fracture of shaft of left humerus, unspecified fracture morphology, initial encounter        Disposition     PATIENT REFERRED TO:  Chelo Sauceda MD  [address] [place] [address] [place]: [facility]            DISCHARGE MEDICATIONS:  New Prescriptions    ACETAMINOPHEN (TYLENOL) 325 MG TABLET    Take 1 tablet by mouth every 6 hours as needed for Pain    ONDANSETRON (ZOFRAN ODT) 4 MG DISINTEGRATING TABLET    Take 1 tablet by mouth every 8 hours as needed for Nausea    OXYCODONE (ROXICODONE) 5 MG IMMEDIATE RELEASE TABLET    Take 1 tablet by mouth every 6 hours as needed for Pain for up to 3 days. WARNING: May cause drowsiness. May impair ability to operate a motor vehicle or machinery.   Do not use in combination with alcohol       DISPOSITION Decision To Discharge 10/29/2021 09:47:27 PM     [place], [place]  10/29/21 0902

## 2021-10-30 ENCOUNTER — APPOINTMENT (OUTPATIENT)
Dept: GENERAL RADIOLOGY | Age: 86
End: 2021-10-30
Payer: MEDICARE

## 2021-10-30 ENCOUNTER — HOSPITAL ENCOUNTER (EMERGENCY)
Age: 86
Discharge: HOME | End: 2021-10-31
Attending: EMERGENCY MEDICINE
Payer: MEDICARE

## 2021-10-30 DIAGNOSIS — M79.602 LEFT ARM PAIN: Primary | ICD-10-CM

## 2021-10-30 PROCEDURE — 73060 X-RAY EXAM OF HUMERUS: CPT

## 2021-10-30 PROCEDURE — 99285 EMERGENCY DEPT VISIT HI MDM: CPT

## 2021-10-30 PROCEDURE — 6370000000 HC RX 637 (ALT 250 FOR IP): Performed by: STUDENT IN AN ORGANIZED HEALTH CARE EDUCATION/TRAINING PROGRAM

## 2021-10-30 PROCEDURE — 29105 APPLICATION LONG ARM SPLINT: CPT

## 2021-10-30 RX ORDER — OXYCODONE HYDROCHLORIDE 5 MG/1
5 TABLET ORAL ONCE
Status: COMPLETED | OUTPATIENT
Start: 2021-10-30 | End: 2021-10-30

## 2021-10-30 RX ORDER — ACETAMINOPHEN 325 MG/1
650 TABLET ORAL ONCE
Status: COMPLETED | OUTPATIENT
Start: 2021-10-30 | End: 2021-10-30

## 2021-10-30 RX ADMIN — OXYCODONE 5 MG: 5 TABLET ORAL at 22:36

## 2021-10-30 RX ADMIN — ACETAMINOPHEN 650 MG: 325 TABLET ORAL at 22:35

## 2021-10-30 ASSESSMENT — PAIN SCALES - GENERAL
PAINLEVEL_OUTOF10: 8
PAINLEVEL_OUTOF10: 8

## 2021-10-30 ASSESSMENT — PAIN DESCRIPTION - DESCRIPTORS: DESCRIPTORS: ACHING

## 2021-10-30 ASSESSMENT — PAIN DESCRIPTION - ORIENTATION: ORIENTATION: LEFT

## 2021-10-30 ASSESSMENT — PAIN DESCRIPTION - FREQUENCY: FREQUENCY: CONTINUOUS

## 2021-10-30 ASSESSMENT — PAIN DESCRIPTION - PAIN TYPE: TYPE: ACUTE PAIN

## 2021-10-30 ASSESSMENT — PAIN DESCRIPTION - LOCATION: LOCATION: ARM

## 2021-10-30 NOTE — ED NOTES
Pt has d/c order. D/C instructions given. Prescriptions given. Pt verbalized understanding. Pt out to lobby.          Su Galeano RN  10/29/21 1597

## 2021-10-31 VITALS
OXYGEN SATURATION: 95 % | BODY MASS INDEX: 23.37 KG/M2 | DIASTOLIC BLOOD PRESSURE: 66 MMHG | SYSTOLIC BLOOD PRESSURE: 153 MMHG | RESPIRATION RATE: 18 BRPM | TEMPERATURE: 97.7 F | HEART RATE: 89 BPM | WEIGHT: 147 LBS

## 2021-10-31 NOTE — ED PROVIDER NOTES
ED Attending Attestation Note     Date of evaluation: 10/30/2021    This patient was seen by the resident. I have seen and examined the patient, agree with the workup, evaluation, management and diagnosis. The care plan has been discussed. My assessment reveals a woman with pain and hand swelling associated with a recently diagnosed left humerus fracture. She does have some soft tissue swelling in the hand. However, neurovascular intact radian, ulnar, median nerve distribution. Normal sensation in the axillary nerve distribution. Normal capillary refill of the hand.      Terrence Avila MD  10/30/21 9220

## 2021-10-31 NOTE — ED NOTES
Pt was explained discharge instructions and questions where answered.       Ramesh Guy RN  10/31/21 0140

## 2021-10-31 NOTE — ED PROVIDER NOTES
[address] [facility] RESIDENT NOTE       Date of evaluation: 10/30/2021    Chief Complaint     Arm Pain      of Present Illness     Brandon Ward is a 80 y.o. female who presents to the emergency department for left arm pain and swelling. Patient sustained a mechanical fall yesterday and was seen in our emergency department and found to have a left midshaft humerus fracture which was displaced. At that time, orthopedic surgery was consulted who recommended placement of coaptation splint and sling and outpatient follow-up. Patient presents today stating that her left arm has become more swollen and she feels that her splint is too tight. She has not had any numbness or paresthesias. She has been using her splint as instructed and has been taking Tylenol for pain. She has not had any new injuries since the time of discharge. Review of Systems     Positive for arm pain and swelling. Negative for fever, chills, chest pain, shortness of breath, abdominal pain, nausea, vomiting, diarrhea, pedal edema. All other symptoms as mentioned previously in the HPI. Past Medical, Surgical, Family, and Social History     She has a past medical history of Diverticulosis, Encounter for screening mammogram for breast cancer, GERD (gastroesophageal reflux disease), Hyperlipidemia, Hypothyroidism, Labyrinthitis, Macular degeneration, Osteopenia, Osteopenia, Other screening mammogram, Other screening mammogram, Rotator cuff tear, right, Screening mammogram for high-risk patient, Screening mammogram for high-risk patient, Screening mammogram, encounter for, Screening mammogram, encounter for, Screening mammogram, encounter for, Screening mammogram, encounter for, Spinal stenosis, Tubular adenoma, and Vitamin D deficiency disease. She has a past surgical history that includes Eye surgery (June, 2007); Appendectomy;  Upper gastrointestinal endoscopy (July, 2009); Colonoscopy (July, 2009 ( 2014 )); Upper gastrointestinal endoscopy (Feb., 2012); Upper gastrointestinal endoscopy (Febr.23, 2015); Colonoscopy (*Apr., 2017 ( prn )); and Colonoscopy (*April 11, 2017 ( prn )). Her family history includes Other (age of onset: 80) in her mother; Other (age of onset: 80) in her father. She reports that she has never smoked. She has never used smokeless tobacco. She reports that she does not drink alcohol and does not use drugs. Medications     Discharge Medication List as of 10/31/2021 12:50 AM      CONTINUE these medications which have NOT CHANGED    Details   oxyCODONE (ROXICODONE) 5 MG immediate release tablet Take 1 tablet by mouth every 6 hours as needed for Pain for up to 3 days. WARNING: May cause drowsiness. May impair ability to operate a motor vehicle or machinery. Do not use in combination with alcohol, Disp-12 tablet, R-0Print      acetaminophen (TYLENOL) 325 MG tablet Take 1 tablet by mouth every 6 hours as needed for Pain, Disp-60 tablet, R-0Print      ondansetron (ZOFRAN ODT) 4 MG disintegrating tablet Take 1 tablet by mouth every 8 hours as needed for Nausea, Disp-20 tablet, R-0Print      Omeprazole 20 MG TBDD Take by mouthHistorical Med      levothyroxine (SYNTHROID) 25 MCG tablet TAKE 1 TABLET DAILY, Disp-90 tablet,R-3Normal      simvastatin (ZOCOR) 20 MG tablet TAKE 1 TABLET EVERY EVENING*ACCORD MFR*, Disp-90 tablet,R-3Normal      Multiple Vitamins-Minerals (PRESERVISION AREDS 2+MULTI VIT PO) Take by mouth dailyHistorical Med      meclizine (ANTIVERT) 25 MG tablet TAKE 1 TABLET THREE TIMES A DAY AS NEEDED FOR VERTIGO, Disp-60 tablet, R-3Normal      B Complex Vitamins (VITAMIN-B COMPLEX) TABS Take  by mouth daily. vitamin D (CHOLECALCIFEROL) 400 UNITS TABS tablet Take 2,000 Units by mouth daily Historical Med             Allergies     She is allergic to codeine, epinephrine, feldene [piroxicam], other, and reglan [metoclopramide hcl].     Physical Exam INITIAL VITALS: BP: (!) 177/83, Temp: 97.7 °F (36.5 °C), Pulse: 89, Resp: 18, SpO2: 95 %     Physical exam  General: well-appearing, no acute distress  HEENT: PERRL. NC/AT. no discharge from the eyes or nose. OP clear. MMM. Cardiovascular: regular rate and rhythm. Strong pulses in all 4 extremities. Pulmonary: non-labored breathing, normal lung sounds  Abdominal: soft, non-tender, non-distended  Musculoskeletal: Coaptation splint in place to the left upper extremity. The splint was removed and the patient has significant tenderness to the left midshaft humerus. The left upper extremity has mild edema which extends to the level of the hand. M/R/U nerves are intact and SILT. Skin: dry, no rashes or lesions  Neuro: alert and oriented, moves all extremities to command, sensation intact to light touch, speech and mentation normal, no focal deficits, gait narrow and stable    DiagnosticResults       RADIOLOGY:  XR HUMERUS LEFT (MIN 2 VIEWS)   Final Result   Impression:   Mid humeral shaft fracture with mild angulation, not significantly changed compared to prior. LABS:   No results found for this visit on 10/30/21. RECENT VITALS:  BP: (!) 153/66, Temp: 97.7 °F (36.5 °C), Pulse: 89,Resp: 18, SpO2: 95 %     Procedures         ED Course     Nursing Notes, Past Medical Hx, Past Surgical Hx, Social Hx, Allergies, and Family Hx were reviewed. The patient was given the followingmedications:  Orders Placed This Encounter   Medications    oxyCODONE (ROXICODONE) immediate release tablet 5 mg    acetaminophen (TYLENOL) tablet 650 mg       CONSULTS:  None    MEDICAL DECISION MAKING / ASSESSMENT / Earnest Gianni is a 80 y.o. female who presents to the emergency department for left arm pain and swelling in the setting of a known left humerus fracture. On examination, the patient does have tenderness, deformity, and edema to the left arm.   She has strong pulses and cap refill and sensation is intact to light touch. Median, radial, and ulnar nerves are intact. X-ray was performed which shows unchanged closed, displaced left midshaft humerus fracture. Patient's coaptation splint was removed and replaced using Ortho-Glass. She was given a new sling. On reassessment, patient's edema is improved and she continues to have strong pulses, good cap refill, sensation, and motor function in the affected extremity. She tells me that the splint feels much more comfortable now and she feels stable for discharge at this time with outpatient orthopedics follow-up. She was also instructed to follow-up with her primary care provider. Patient was given discharge instructions and strict return precautions. All questions were answered appropriately. Patient verbalized understanding and agreement with the above treatment plan. This patient was also evaluated by the attending physician. All care plans were discussed and agreed upon. Clinical Impression     1.  Left arm pain        Disposition     PATIENT REFERRED TO:  Ligia Jo MD  [address] [facility] [address] [phone]    Schedule an appointment as soon as possible for a visit in 2 days      [facility] Emergency Department  [address]  [address]  [phone]    As needed, If symptoms worsen    Carey Owusu MD  [address]: [address] [facility]  [phone]    Call in 1 day      [address]  [phone]          DISCHARGE MEDICATIONS:  Discharge Medication List as of 10/31/2021 12:50 AM          DISPOSITION Decision To Discharge 10/31/2021 12:43:37 AM       Radha Valverde MD  Resident  10/31/21 8209

## 2021-12-07 ENCOUNTER — CARE COORDINATION (OUTPATIENT)
Dept: CARE COORDINATION | Age: 86
End: 2021-12-07

## 2021-12-07 NOTE — CARE COORDINATION
Per Ramakrishna Email:    Admitted to [facility] ([phone]) 11/12/21 and [facility]  and [facility] referral, with facility recommendation of 24/7 supervision. No member phone number or contact information given. 80 yr old female admitted to SNF from home s/p fall resulting in FX L humerus. Member [facility] , [facility] .  therapy referred member to SNF due to  extensive assist required at friends home. NWB LUE with brace. Member has already discharged upon [facility] notification of admission.      Thank you,  Omid Ruiz RN  Care Coordinator  M: [phone]

## 2023-08-28 NOTE — ED PROVIDER NOTES
Attempted to contact pt regarding request for call back. No answer. Left message for pt to return call.       ----- Message from Arturo Elizondo sent at 8/28/2023  2:28 PM CDT -----  Type:  Patient Returning Call    Who Called: Self     Who Left Message for Patient: Susi     Does the patient know what this is regarding?:YES     Would the patient rather a call back or a response via [facility]? CALL     Best Call Back Number: [phone] (home)        Osteopenia, Osteopenia, Other screening mammogram, Other screening mammogram, Rotator cuff tear, right, Screening mammogram for high-risk patient, Screening mammogram, encounter for, Screening mammogram, encounter for, Screening mammogram, encounter for, Screening mammogram, encounter for, Spinal stenosis, Tubular adenoma, and Vitamin D deficiency disease. She has a past surgical history that includes Eye surgery (June, 2007); Appendectomy; Upper gastrointestinal endoscopy (July, 2009); Colonoscopy (July, 2009 ( 2014 )); Upper gastrointestinal endoscopy (Feb., 2012); Upper gastrointestinal endoscopy (Febr.23, 2015); Colonoscopy (*Apr., 2017 ( prn )); and Colonoscopy (*April 11, 2017 ( prn )). Her family history includes Other (age of onset: 80) in her mother; Other (age of onset: 80) in her father. She reports that she has never smoked. She has never used smokeless tobacco. She reports that she does not drink alcohol. Medications     Discharge Medication List as of 9/24/2019  7:22 PM      CONTINUE these medications which have NOT CHANGED    Details   levothyroxine (SYNTHROID) 25 MCG tablet TAKE 1 TABLET DAILY, Disp-90 tablet, R-3Normal      famotidine (PEPCID) 20 MG tablet Take 20 mg by mouth dailyHistorical Med      simvastatin (ZOCOR) 20 MG tablet TAKE 1 TABLET EVERY EVENING*ACCORD MFR*, Disp-90 tablet, R-3Normal      Multiple Vitamins-Minerals (PRESERVISION AREDS 2+MULTI VIT PO) Take by mouth dailyHistorical Med      meclizine (ANTIVERT) 25 MG tablet TAKE 1 TABLET THREE TIMES A DAY AS NEEDED FOR VERTIGO, Disp-60 tablet, R-3Normal      B Complex Vitamins (VITAMIN-B COMPLEX) TABS Take  by mouth daily. vitamin D (CHOLECALCIFEROL) 400 UNITS TABS tablet Take 2,000 Units by mouth daily Historical Med             Allergies     She is allergic to codeine; epinephrine; feldene [piroxicam]; other; and reglan [metoclopramide hcl].     Physical Exam     INITIAL VITALS: BP: (!) 166/81, Temp: 98.3 °F (36.8 °C), Pulse: 98, Resp: 18, SpO2: 97 %     General: Very well-appearing elderly patient, seated upright in the emergency department bed, in no acute distress. HEENT: Pupils are equal, round, and reactive to light. Extraocular muscles are intact. Conjunctivae are clear and moist. No redness or drainage from the eyes. No drainage from the nose. The oropharynx appeared to be normal.  There is a moderate size developing cephalhematoma over the left temporal aspect of the scalp, partially within the hairline. There is mild tenderness to palpation overlying this, without any other facial or scalp injury or tenderness to palpation. Neck: Supple, with full range of motion. No midline C-spine tenderness to palpation, crepitus, or step-offs. Back: No midline T or L spine tenderness to palpation, crepitus, or step-offs. Chest: Not tender to palpation. Cardiovascular: Normal S1-S2 without murmur rub or gallop. 2+ radial pulses bilaterally. Respiratory: Unlabored breathing with equal chest rise and fall. Lungs are clear to auscultation bilaterally. No adventitious lung sounds heard. Abdomen: Soft and nontender, without guarding or rebound tenderness. No masses or hepatosplenomegaly. Skin: Warm and dry, without rashes or ecchymoses, lacerations or abrasions. Neuro: Alert and oriented x3. No focal neurologic deficits are noted. Extremities: Warm and well-perfused. The patient moves all extremities equally. There are no long bone or joint deformities, swelling, tenderness to palpation, or limitation of range of motion by pain. Psych: The patient's mood and affect are generally within normal limits for their presentation. Diagnostic Results       RADIOLOGY:  CT Head WO Contrast   Final Result      No acute intracranial abnormality. LABS:   No results found for this visit on 09/24/19.       RECENT VITALS:  BP: (!) 166/81, Temp: 98.3 °F (36.8 °C), Pulse: 98, Resp: 18, SpO2: 97 %

## 2024-02-06 ENCOUNTER — TELEPHONE (OUTPATIENT)
Dept: INTERNAL MEDICINE CLINIC | Age: 89
End: 2024-02-06

## 2024-02-06 NOTE — TELEPHONE ENCOUNTER
( POD ) PT'S FAMILY FRIEND YAAKOV BUSTILLOS WHO IS NOT IS LISTED AS [facility]ERG CONTACT STATED PT'S TOENAILS, BIG TOE TENDER AND RED, POSSIBLE INFECTION. YAAKOV IS TRYING SCHED AN HERMILO TO POD AS A  NEW PT. YAAKOV STATED PT HAS A PCP WITH [facility] AND PT WAS REFERRED TO POD SOMEWHERE ELSE, BUT NO HERMILO UNTIL MARCH SOMETIME, THAT OFFICE TOLD HER TO CALL [facility] POD. YAAKOV CAN BE REACHED [phone]